# Patient Record
Sex: MALE | Race: BLACK OR AFRICAN AMERICAN | NOT HISPANIC OR LATINO
[De-identification: names, ages, dates, MRNs, and addresses within clinical notes are randomized per-mention and may not be internally consistent; named-entity substitution may affect disease eponyms.]

---

## 2017-12-01 ENCOUNTER — TRANSCRIPTION ENCOUNTER (OUTPATIENT)
Age: 77
End: 2017-12-01

## 2018-09-05 ENCOUNTER — APPOINTMENT (OUTPATIENT)
Dept: INTERNAL MEDICINE | Facility: CLINIC | Age: 78
End: 2018-09-05

## 2018-09-05 VITALS
OXYGEN SATURATION: 96 % | TEMPERATURE: 98.5 F | HEART RATE: 77 BPM | DIASTOLIC BLOOD PRESSURE: 88 MMHG | SYSTOLIC BLOOD PRESSURE: 136 MMHG | BODY MASS INDEX: 24.05 KG/M2 | HEIGHT: 70 IN | WEIGHT: 168 LBS

## 2018-09-05 NOTE — HISTORY OF PRESENT ILLNESS
[FreeTextEntry1] : routine phsical in 78 yo male [de-identified] : pt with hx of dm, htn and hyperlipidemia. on norvasc and metformin. pt feels well without complaints. no chest pain or sob. he follows a diet. His weight is stable.

## 2018-09-05 NOTE — PHYSICAL EXAM

## 2018-09-06 LAB
25(OH)D3 SERPL-MCNC: 29.2 NG/ML
ALBUMIN SERPL ELPH-MCNC: 4.6 G/DL
ALP BLD-CCNC: 112 U/L
ALT SERPL-CCNC: 8 U/L
ANION GAP SERPL CALC-SCNC: 15 MMOL/L
AST SERPL-CCNC: 22 U/L
BASOPHILS # BLD AUTO: 0.01 K/UL
BASOPHILS NFR BLD AUTO: 0.2 %
BILIRUB SERPL-MCNC: 0.3 MG/DL
BUN SERPL-MCNC: 15 MG/DL
CALCIUM SERPL-MCNC: 9.6 MG/DL
CHLORIDE SERPL-SCNC: 103 MMOL/L
CHOLEST SERPL-MCNC: 131 MG/DL
CHOLEST/HDLC SERPL: 3.3 RATIO
CO2 SERPL-SCNC: 24 MMOL/L
CREAT SERPL-MCNC: 1.37 MG/DL
EOSINOPHIL # BLD AUTO: 0.04 K/UL
EOSINOPHIL NFR BLD AUTO: 0.7 %
GLUCOSE SERPL-MCNC: 98 MG/DL
HBA1C MFR BLD HPLC: 7.2 %
HCT VFR BLD CALC: 37.5 %
HDLC SERPL-MCNC: 40 MG/DL
HGB BLD-MCNC: 11.6 G/DL
IMM GRANULOCYTES NFR BLD AUTO: 0 %
LDLC SERPL CALC-MCNC: 67 MG/DL
LYMPHOCYTES # BLD AUTO: 3.2 K/UL
LYMPHOCYTES NFR BLD AUTO: 57.3 %
MAN DIFF?: NORMAL
MCHC RBC-ENTMCNC: 27.6 PG
MCHC RBC-ENTMCNC: 30.9 GM/DL
MCV RBC AUTO: 89.1 FL
MONOCYTES # BLD AUTO: 0.51 K/UL
MONOCYTES NFR BLD AUTO: 9.1 %
NEUTROPHILS # BLD AUTO: 1.82 K/UL
NEUTROPHILS NFR BLD AUTO: 32.7 %
PLATELET # BLD AUTO: 108 K/UL
POTASSIUM SERPL-SCNC: 4.3 MMOL/L
PROT SERPL-MCNC: 7.7 G/DL
PSA SERPL-MCNC: 4.22 NG/ML
RBC # BLD: 4.21 M/UL
RBC # FLD: 17 %
SODIUM SERPL-SCNC: 142 MMOL/L
TRIGL SERPL-MCNC: 120 MG/DL
TSH SERPL-ACNC: 1.47 UIU/ML
WBC # FLD AUTO: 5.58 K/UL

## 2018-12-26 ENCOUNTER — RX RENEWAL (OUTPATIENT)
Age: 78
End: 2018-12-26

## 2019-01-09 ENCOUNTER — APPOINTMENT (OUTPATIENT)
Dept: INTERNAL MEDICINE | Facility: CLINIC | Age: 79
End: 2019-01-09
Payer: MEDICARE

## 2019-01-09 VITALS
HEART RATE: 76 BPM | OXYGEN SATURATION: 98 % | TEMPERATURE: 98 F | BODY MASS INDEX: 24.34 KG/M2 | WEIGHT: 170 LBS | HEIGHT: 70 IN | SYSTOLIC BLOOD PRESSURE: 110 MMHG | DIASTOLIC BLOOD PRESSURE: 80 MMHG

## 2019-01-09 PROCEDURE — 36415 COLL VENOUS BLD VENIPUNCTURE: CPT

## 2019-01-09 PROCEDURE — 99214 OFFICE O/P EST MOD 30 MIN: CPT | Mod: 25

## 2019-01-09 PROCEDURE — G0008: CPT

## 2019-01-09 PROCEDURE — 90662 IIV NO PRSV INCREASED AG IM: CPT

## 2019-01-09 NOTE — ASSESSMENT
[FreeTextEntry1] : Patient is to continue current medication. We'll check routine labs including urine for microalbumin. Influenza vaccine high dose has been given here

## 2019-01-09 NOTE — HISTORY OF PRESENT ILLNESS
[FreeTextEntry1] : Routine followup diabetes. [de-identified] : This is a 78-year-old male with a history of diabetes, thrombocytopenia. The patient feels well. Only complaint is occasional stiffness in the morning. He does not check his blood sugar. He generally is watching his diet his weight is stable at 170.

## 2019-01-10 LAB
ALBUMIN SERPL ELPH-MCNC: 4.7 G/DL
ALP BLD-CCNC: 126 U/L
ALT SERPL-CCNC: 5 U/L
ANION GAP SERPL CALC-SCNC: 11 MMOL/L
AST SERPL-CCNC: 12 U/L
BASOPHILS # BLD AUTO: 0.01 K/UL
BASOPHILS NFR BLD AUTO: 0.2 %
BILIRUB SERPL-MCNC: 0.3 MG/DL
BUN SERPL-MCNC: 17 MG/DL
CALCIUM SERPL-MCNC: 9.6 MG/DL
CHLORIDE SERPL-SCNC: 103 MMOL/L
CO2 SERPL-SCNC: 27 MMOL/L
CREAT SERPL-MCNC: 1.33 MG/DL
CREAT SPEC-SCNC: 364 MG/DL
EOSINOPHIL # BLD AUTO: 0.04 K/UL
EOSINOPHIL NFR BLD AUTO: 0.7 %
GLUCOSE SERPL-MCNC: 179 MG/DL
HBA1C MFR BLD HPLC: 8.1 %
HCT VFR BLD CALC: 38.6 %
HGB BLD-MCNC: 12.4 G/DL
IMM GRANULOCYTES NFR BLD AUTO: 0.2 %
LYMPHOCYTES # BLD AUTO: 2.88 K/UL
LYMPHOCYTES NFR BLD AUTO: 53 %
MAN DIFF?: NORMAL
MCHC RBC-ENTMCNC: 28.5 PG
MCHC RBC-ENTMCNC: 32.1 GM/DL
MCV RBC AUTO: 88.7 FL
MICROALBUMIN 24H UR DL<=1MG/L-MCNC: 3.5 MG/DL
MICROALBUMIN/CREAT 24H UR-RTO: 10 MG/G
MONOCYTES # BLD AUTO: 0.53 K/UL
MONOCYTES NFR BLD AUTO: 9.8 %
NEUTROPHILS # BLD AUTO: 1.96 K/UL
NEUTROPHILS NFR BLD AUTO: 36.1 %
PLATELET # BLD AUTO: 118 K/UL
POTASSIUM SERPL-SCNC: 5 MMOL/L
PROT SERPL-MCNC: 7.7 G/DL
RBC # BLD: 4.35 M/UL
RBC # FLD: 15.6 %
SODIUM SERPL-SCNC: 141 MMOL/L
WBC # FLD AUTO: 5.43 K/UL

## 2019-04-30 ENCOUNTER — APPOINTMENT (OUTPATIENT)
Dept: INTERNAL MEDICINE | Facility: CLINIC | Age: 79
End: 2019-04-30
Payer: MEDICARE

## 2019-04-30 ENCOUNTER — NON-APPOINTMENT (OUTPATIENT)
Age: 79
End: 2019-04-30

## 2019-04-30 VITALS
BODY MASS INDEX: 24.62 KG/M2 | SYSTOLIC BLOOD PRESSURE: 110 MMHG | HEART RATE: 69 BPM | WEIGHT: 172 LBS | DIASTOLIC BLOOD PRESSURE: 70 MMHG | OXYGEN SATURATION: 100 % | HEIGHT: 70 IN

## 2019-04-30 PROCEDURE — 36415 COLL VENOUS BLD VENIPUNCTURE: CPT

## 2019-04-30 PROCEDURE — 99214 OFFICE O/P EST MOD 30 MIN: CPT | Mod: 25

## 2019-04-30 PROCEDURE — 93000 ELECTROCARDIOGRAM COMPLETE: CPT

## 2019-04-30 NOTE — ASSESSMENT
[FreeTextEntry1] : EKG reveals minor nonspecific T-wave flattening in leads one and aVL. Patient's chest pain is certainly atypical. We'll check a hemoglobin A1c. Will have patient see a cardiologist regarding atypical chest pain

## 2019-04-30 NOTE — HISTORY OF PRESENT ILLNESS
[FreeTextEntry1] : Patient with atypical left anterior chest pain. [de-identified] : Patient is a 78-year-old diabetic who complains over the past 2 weeks for a vague left anterior chest dull discomfort which occurs on awakening in the morning. It lasts for about a half-hour to an hour and then goes away as the patient becomes more active. There is no pain the rest of the day. The discomfort is dull. There is no nausea vomiting no diaphoresis. There is no history of known cardiac disease. Patient is diabetic with his last hemoglobin A1c of 8.1. The patient tries to follow a diabetic diet but does not monitor his blood sugars. He is on metformin thousand milligrams b.i.d. Patient baseline mental status/Alert and oriented to person, place and time/Awake

## 2019-05-01 LAB
ESTIMATED AVERAGE GLUCOSE: 197 MG/DL
HBA1C MFR BLD HPLC: 8.5 %

## 2019-05-02 ENCOUNTER — APPOINTMENT (OUTPATIENT)
Dept: CARDIOLOGY | Facility: CLINIC | Age: 79
End: 2019-05-02
Payer: MEDICARE

## 2019-05-02 VITALS
TEMPERATURE: 98 F | HEIGHT: 70 IN | RESPIRATION RATE: 12 BRPM | SYSTOLIC BLOOD PRESSURE: 158 MMHG | WEIGHT: 168 LBS | HEART RATE: 60 BPM | OXYGEN SATURATION: 97 % | BODY MASS INDEX: 24.05 KG/M2 | DIASTOLIC BLOOD PRESSURE: 84 MMHG

## 2019-05-02 PROCEDURE — 99204 OFFICE O/P NEW MOD 45 MIN: CPT

## 2019-05-02 NOTE — PHYSICAL EXAM
[General Appearance - Well Developed] : well developed [General Appearance - Well Nourished] : well nourished [General Appearance - In No Acute Distress] : no acute distress [Normal Conjunctiva] : the conjunctiva exhibited no abnormalities [FreeTextEntry1] : No JVD present [Normal Rate] : normal [Rhythm Regular] : regular [Normal S1] : normal S1 [Normal S2] : normal S2 [S3] : no S3 [S4] : no S4 [No Murmur] : no murmurs heard [Right Carotid Bruit] : no bruit heard over the right carotid [Left Carotid Bruit] : no bruit heard over the left carotid [2+] : left 2+ [No Pitting Edema] : no pitting edema present [] : no respiratory distress [Respiration, Rhythm And Depth] : normal respiratory rhythm and effort [Auscultation Breath Sounds / Voice Sounds] : lungs were clear to auscultation bilaterally [Bowel Sounds] : normal bowel sounds [Abnormal Walk] : normal gait [Nail Clubbing] : no clubbing of the fingernails [Cyanosis, Localized] : no localized cyanosis [Oriented To Time, Place, And Person] : oriented to person, place, and time [Affect] : the affect was normal [Mood] : the mood was normal

## 2019-05-02 NOTE — ASSESSMENT
[FreeTextEntry1] : 77 yo male with hypertension, hyperlipidemia, and DM type 2, with new onset non-exertional chest pain over the past 2 weeks in addition to new onset exertional dyspnea. \par \par Given patient's symptoms and cardiac risk factors, will perform treadmill nuclear stress test for ischemic evaluation/risk stratification.\par Will perform echocardiogram to assess LV function and structural heart disease.\par Pending review of test results, will determine if further cardiac work-up or intervention is clinically indicated.\par \par Blood pressure is currently not well controlled on current regimen of amlodipine 10 mg po daily.\par Will add ramipril 5 mg po daily to current BP med regimen.\par Return for labs in 2 weeks to check BMP.\par \par LDL = 67 per 9/5/18 labs. Will continue current management of hyperlipidemia with atorvastatin 10 mg po daily for now pending stress test results.

## 2019-05-02 NOTE — REVIEW OF SYSTEMS
[Dyspnea on exertion] : dyspnea during exertion [Chest Pain] : chest pain [Lower Ext Edema] : no extremity edema [Palpitations] : no palpitations [Cough] : no cough [Wheezing] : no wheezing [Coughing Up Blood] : no hemoptysis [Negative] : Heme/Lymph

## 2019-05-02 NOTE — HISTORY OF PRESENT ILLNESS
[FreeTextEntry1] : 77 yo male with hypertension, hyperlipidemia, and DM type 2, who presents for chest pain evaluation. He reports his first episode of left sided chest pain ~ 2 weeks ago upon waking in the morning which lasted for 1 hour before resolving. However, he has been having recurring similar chest pain every morning since that time, but only lasts for several minutes before resolving. Patient denies dyspnea, palpitations, syncope, edema, melena, hematochezia, or hematemesis. He denies exertional chest with stairs, but does report new exertional dyspnea when walking up hills. He denies any prior cardiac testing.\par \par PMD: Damon Johnson MD

## 2019-05-16 ENCOUNTER — APPOINTMENT (OUTPATIENT)
Dept: CARDIOLOGY | Facility: CLINIC | Age: 79
End: 2019-05-16

## 2019-05-20 ENCOUNTER — MESSAGE (OUTPATIENT)
Age: 79
End: 2019-05-20

## 2019-05-20 LAB
ANION GAP SERPL CALC-SCNC: 12 MMOL/L
BUN SERPL-MCNC: 15 MG/DL
CALCIUM SERPL-MCNC: 9.5 MG/DL
CHLORIDE SERPL-SCNC: 104 MMOL/L
CO2 SERPL-SCNC: 25 MMOL/L
CREAT SERPL-MCNC: 1.11 MG/DL
GLUCOSE SERPL-MCNC: 166 MG/DL
POTASSIUM SERPL-SCNC: 4.6 MMOL/L
SODIUM SERPL-SCNC: 141 MMOL/L

## 2019-05-21 ENCOUNTER — APPOINTMENT (OUTPATIENT)
Dept: CARDIOLOGY | Facility: CLINIC | Age: 79
End: 2019-05-21
Payer: MEDICARE

## 2019-05-21 PROCEDURE — 93306 TTE W/DOPPLER COMPLETE: CPT

## 2019-05-22 ENCOUNTER — MESSAGE (OUTPATIENT)
Age: 79
End: 2019-05-22

## 2019-05-28 ENCOUNTER — RESULT REVIEW (OUTPATIENT)
Age: 79
End: 2019-05-28

## 2019-05-29 ENCOUNTER — MESSAGE (OUTPATIENT)
Age: 79
End: 2019-05-29

## 2019-08-30 ENCOUNTER — MEDICATION RENEWAL (OUTPATIENT)
Age: 79
End: 2019-08-30

## 2019-11-06 ENCOUNTER — APPOINTMENT (OUTPATIENT)
Dept: INTERNAL MEDICINE | Facility: CLINIC | Age: 79
End: 2019-11-06

## 2019-11-07 ENCOUNTER — LABORATORY RESULT (OUTPATIENT)
Age: 79
End: 2019-11-07

## 2019-11-07 ENCOUNTER — APPOINTMENT (OUTPATIENT)
Dept: INTERNAL MEDICINE | Facility: CLINIC | Age: 79
End: 2019-11-07
Payer: MEDICARE

## 2019-11-07 DIAGNOSIS — R07.9 CHEST PAIN, UNSPECIFIED: ICD-10-CM

## 2019-11-07 DIAGNOSIS — R06.02 SHORTNESS OF BREATH: ICD-10-CM

## 2019-11-07 PROCEDURE — 36415 COLL VENOUS BLD VENIPUNCTURE: CPT

## 2019-11-08 LAB
25(OH)D3 SERPL-MCNC: 25.6 NG/ML
ALBUMIN SERPL ELPH-MCNC: 4.5 G/DL
ALP BLD-CCNC: 116 U/L
ALT SERPL-CCNC: 7 U/L
ANION GAP SERPL CALC-SCNC: 12 MMOL/L
AST SERPL-CCNC: 15 U/L
BASOPHILS # BLD AUTO: 0.02 K/UL
BASOPHILS NFR BLD AUTO: 0.4 %
BILIRUB SERPL-MCNC: 0.3 MG/DL
BUN SERPL-MCNC: 13 MG/DL
CALCIUM SERPL-MCNC: 9.5 MG/DL
CHLORIDE SERPL-SCNC: 102 MMOL/L
CHOLEST SERPL-MCNC: 135 MG/DL
CHOLEST/HDLC SERPL: 2.9 RATIO
CO2 SERPL-SCNC: 27 MMOL/L
CREAT SERPL-MCNC: 1.1 MG/DL
EOSINOPHIL # BLD AUTO: 0.04 K/UL
EOSINOPHIL NFR BLD AUTO: 0.9 %
ESTIMATED AVERAGE GLUCOSE: 143 MG/DL
GLUCOSE SERPL-MCNC: 177 MG/DL
HBA1C MFR BLD HPLC: 6.6 %
HCT VFR BLD CALC: 38.1 %
HDLC SERPL-MCNC: 47 MG/DL
HGB BLD-MCNC: 11.9 G/DL
IMM GRANULOCYTES NFR BLD AUTO: 0.2 %
LDLC SERPL CALC-MCNC: 70 MG/DL
LYMPHOCYTES # BLD AUTO: 2.39 K/UL
LYMPHOCYTES NFR BLD AUTO: 53.6 %
MAN DIFF?: NORMAL
MCHC RBC-ENTMCNC: 29 PG
MCHC RBC-ENTMCNC: 31.2 GM/DL
MCV RBC AUTO: 92.9 FL
MONOCYTES # BLD AUTO: 0.47 K/UL
MONOCYTES NFR BLD AUTO: 10.5 %
NEUTROPHILS # BLD AUTO: 1.53 K/UL
NEUTROPHILS NFR BLD AUTO: 34.4 %
PLATELET # BLD AUTO: 90 K/UL
POTASSIUM SERPL-SCNC: 4.5 MMOL/L
PROT SERPL-MCNC: 7.4 G/DL
PSA SERPL-MCNC: 6.84 NG/ML
RBC # BLD: 4.1 M/UL
RBC # FLD: 16.4 %
SODIUM SERPL-SCNC: 141 MMOL/L
TRIGL SERPL-MCNC: 88 MG/DL
TSH SERPL-ACNC: 1.07 UIU/ML
WBC # FLD AUTO: 4.46 K/UL

## 2019-11-12 ENCOUNTER — APPOINTMENT (OUTPATIENT)
Dept: INTERNAL MEDICINE | Facility: CLINIC | Age: 79
End: 2019-11-12
Payer: MEDICARE

## 2019-11-12 VITALS
HEIGHT: 70 IN | BODY MASS INDEX: 23.91 KG/M2 | HEART RATE: 68 BPM | WEIGHT: 167 LBS | OXYGEN SATURATION: 97 % | SYSTOLIC BLOOD PRESSURE: 110 MMHG | DIASTOLIC BLOOD PRESSURE: 70 MMHG

## 2019-11-12 PROCEDURE — 99214 OFFICE O/P EST MOD 30 MIN: CPT | Mod: 25

## 2019-11-12 PROCEDURE — 90686 IIV4 VACC NO PRSV 0.5 ML IM: CPT

## 2019-11-12 PROCEDURE — G0008: CPT

## 2019-11-12 NOTE — HISTORY OF PRESENT ILLNESS
[de-identified] : This is a 79-year-old male with a history of diabetes, hypertension thrombocytopenia and BPH. His medications were reviewed. They include amlodipine 10, Lipitor 10, metformin thousand milligrams b.i.d. Patient generally feels well without chest pain or shortness of breath. Patient is known to have thrombocytopenia. He saw a hematologist about 3 years ago. Patient's last colonoscopy was 3 years ago. He is not sure if he had the pneumonia vaccine. His memory is slightly impaired. He's been watching his diet more carefully. His labs are currently remarkable for hemoglobin A1c of 6.6, PSA of 6.84 and a platelet count of 90,000. [FreeTextEntry1] : Routine physical and 79-year-old male

## 2019-11-12 NOTE — ASSESSMENT
[FreeTextEntry1] : Patient's blood pressure and diabetes appear under reasonably good control. I am concerned about the platelet count 90,000. I will ask patient to see a hematologist. The elevated PSA goes along with his markedly enlarged prostate. Influenza vaccine high dose has been given. Patient is to return to see me in 6 months

## 2019-11-12 NOTE — PHYSICAL EXAM
[No Acute Distress] : no acute distress [Well Nourished] : well nourished [Well Developed] : well developed [PERRL] : pupils equal round and reactive to light [Well-Appearing] : well-appearing [Normal Sclera/Conjunctiva] : normal sclera/conjunctiva [EOMI] : extraocular movements intact [Normal Oropharynx] : the oropharynx was normal [Normal Outer Ear/Nose] : the outer ears and nose were normal in appearance [No JVD] : no jugular venous distention [No Lymphadenopathy] : no lymphadenopathy [Supple] : supple [Thyroid Normal, No Nodules] : the thyroid was normal and there were no nodules present [No Respiratory Distress] : no respiratory distress  [No Accessory Muscle Use] : no accessory muscle use [Clear to Auscultation] : lungs were clear to auscultation bilaterally [Normal Rate] : normal rate  [Normal S1, S2] : normal S1 and S2 [Regular Rhythm] : with a regular rhythm [No Murmur] : no murmur heard [No Carotid Bruits] : no carotid bruits [No Abdominal Bruit] : a ~M bruit was not heard ~T in the abdomen [No Varicosities] : no varicosities [Pedal Pulses Present] : the pedal pulses are present [No Edema] : there was no peripheral edema [No Palpable Aorta] : no palpable aorta [No Extremity Clubbing/Cyanosis] : no extremity clubbing/cyanosis [Soft] : abdomen soft [Non Tender] : non-tender [Non-distended] : non-distended [No Masses] : no abdominal mass palpated [No HSM] : no HSM [Normal Bowel Sounds] : normal bowel sounds [No Mass] : no mass [Normal Posterior Cervical Nodes] : no posterior cervical lymphadenopathy [Normal Anterior Cervical Nodes] : no anterior cervical lymphadenopathy [No CVA Tenderness] : no CVA  tenderness [No Spinal Tenderness] : no spinal tenderness [No Joint Swelling] : no joint swelling [No Rash] : no rash [Grossly Normal Strength/Tone] : grossly normal strength/tone [No Focal Deficits] : no focal deficits [Coordination Grossly Intact] : coordination grossly intact [Normal Gait] : normal gait [Normal Affect] : the affect was normal [Normal Insight/Judgement] : insight and judgment were intact [Stool Occult Blood] : stool negative for occult blood [FreeTextEntry1] : 3+ prostate

## 2020-03-31 ENCOUNTER — RX RENEWAL (OUTPATIENT)
Age: 80
End: 2020-03-31

## 2020-05-12 ENCOUNTER — APPOINTMENT (OUTPATIENT)
Dept: INTERNAL MEDICINE | Facility: CLINIC | Age: 80
End: 2020-05-12

## 2020-11-09 ENCOUNTER — RESULT CHARGE (OUTPATIENT)
Age: 80
End: 2020-11-09

## 2020-11-10 ENCOUNTER — APPOINTMENT (OUTPATIENT)
Dept: INTERNAL MEDICINE | Facility: CLINIC | Age: 80
End: 2020-11-10
Payer: MEDICARE

## 2020-11-10 ENCOUNTER — NON-APPOINTMENT (OUTPATIENT)
Age: 80
End: 2020-11-10

## 2020-11-10 VITALS
OXYGEN SATURATION: 98 % | DIASTOLIC BLOOD PRESSURE: 80 MMHG | SYSTOLIC BLOOD PRESSURE: 120 MMHG | HEART RATE: 69 BPM | WEIGHT: 163 LBS | HEIGHT: 70 IN | BODY MASS INDEX: 23.34 KG/M2

## 2020-11-10 DIAGNOSIS — Z00.00 ENCOUNTER FOR GENERAL ADULT MEDICAL EXAMINATION W/OUT ABNORMAL FINDINGS: ICD-10-CM

## 2020-11-10 PROCEDURE — 90662 IIV NO PRSV INCREASED AG IM: CPT

## 2020-11-10 PROCEDURE — 93000 ELECTROCARDIOGRAM COMPLETE: CPT

## 2020-11-10 PROCEDURE — 36415 COLL VENOUS BLD VENIPUNCTURE: CPT

## 2020-11-10 PROCEDURE — G0008: CPT

## 2020-11-10 PROCEDURE — G0439: CPT

## 2020-11-10 NOTE — PHYSICAL EXAM
[No Acute Distress] : no acute distress [Well Nourished] : well nourished [Well Developed] : well developed [Well-Appearing] : well-appearing [Normal Sclera/Conjunctiva] : normal sclera/conjunctiva [PERRL] : pupils equal round and reactive to light [EOMI] : extraocular movements intact [Normal Outer Ear/Nose] : the outer ears and nose were normal in appearance [Normal Oropharynx] : the oropharynx was normal [No JVD] : no jugular venous distention [No Lymphadenopathy] : no lymphadenopathy [Supple] : supple [Thyroid Normal, No Nodules] : the thyroid was normal and there were no nodules present [No Respiratory Distress] : no respiratory distress  [No Accessory Muscle Use] : no accessory muscle use [Clear to Auscultation] : lungs were clear to auscultation bilaterally [Normal Rate] : normal rate  [Regular Rhythm] : with a regular rhythm [Normal S1, S2] : normal S1 and S2 [No Murmur] : no murmur heard [No Carotid Bruits] : no carotid bruits [No Abdominal Bruit] : a ~M bruit was not heard ~T in the abdomen [No Varicosities] : no varicosities [Pedal Pulses Present] : the pedal pulses are present [No Edema] : there was no peripheral edema [No Palpable Aorta] : no palpable aorta [No Extremity Clubbing/Cyanosis] : no extremity clubbing/cyanosis [Soft] : abdomen soft [Non Tender] : non-tender [Non-distended] : non-distended [No Masses] : no abdominal mass palpated [No HSM] : no HSM [Normal Bowel Sounds] : normal bowel sounds [Normal Posterior Cervical Nodes] : no posterior cervical lymphadenopathy [Normal Anterior Cervical Nodes] : no anterior cervical lymphadenopathy [No CVA Tenderness] : no CVA  tenderness [No Spinal Tenderness] : no spinal tenderness [No Joint Swelling] : no joint swelling [Grossly Normal Strength/Tone] : grossly normal strength/tone [No Rash] : no rash [Coordination Grossly Intact] : coordination grossly intact [No Focal Deficits] : no focal deficits [Normal Gait] : normal gait [Normal Affect] : the affect was normal [Normal Insight/Judgement] : insight and judgment were intact [FreeTextEntry1] : 2+ prostate

## 2020-11-10 NOTE — HEALTH RISK ASSESSMENT
[Very Good] : ~his/her~  mood as very good [No] : No [No falls in past year] : Patient reported no falls in the past year [0] : 2) Feeling down, depressed, or hopeless: Not at all (0) [No Retinopathy] : No retinopathy [Patient reported colonoscopy was normal] : Patient reported colonoscopy was normal [HIV test declined] : HIV test declined [Hepatitis C test declined] : Hepatitis C test declined [None] : None [With Family] : lives with family [# of Members in Household ___] :  household currently consist of [unfilled] member(s) [Retired] : retired [] :  [# Of Children ___] : has [unfilled] children [Feels Safe at Home] : Feels safe at home [Fully functional (bathing, dressing, toileting, transferring, walking, feeding)] : Fully functional (bathing, dressing, toileting, transferring, walking, feeding) [Fully functional (using the telephone, shopping, preparing meals, housekeeping, doing laundry, using] : Fully functional and needs no help or supervision to perform IADLs (using the telephone, shopping, preparing meals, housekeeping, doing laundry, using transportation, managing medications and managing finances) [Reports normal functional visual acuity (ie: able to read med bottle)] : Reports normal functional visual acuity [Patient/Caregiver not ready to engage] : Patient/Caregiver not ready to engage [] : No [de-identified] : works around house [de-identified] : good [EyeExamDate] : 01/19 [Change in mental status noted] : No change in mental status noted [Language] : denies difficulty with language [Behavior] : denies difficulty with behavior [Learning/Retaining New Information] : denies difficulty learning/retaining new information [Handling Complex Tasks] : denies difficulty handling complex tasks [Reasoning] : denies difficulty with reasoning [Spatial Ability and Orientation] : denies difficulty with spatial ability and orientation [Reports changes in hearing] : Reports no changes in hearing [Reports changes in vision] : Reports no changes in vision [ColonoscopyDate] : 01/15

## 2020-11-10 NOTE — ASSESSMENT
[FreeTextEntry1] : Patient appears in good health. We'll check routine labs. High dose flu vaccine has been given.

## 2020-11-10 NOTE — HISTORY OF PRESENT ILLNESS
[FreeTextEntry1] :  wellness exam. [de-identified] : This is an 80-year-old male with a history of diabetes, hypertension, hyperlipidemia and chronic thrombocytopenia. He has not been followed in the past one year. His medications include metformin 1000 b.i.d., amlodipine 10, Lipitor 10. He feels well without chronic complaints.

## 2020-11-19 LAB
25(OH)D3 SERPL-MCNC: 23.1 NG/ML
ALBUMIN SERPL ELPH-MCNC: 4.5 G/DL
ALP BLD-CCNC: 138 U/L
ALT SERPL-CCNC: 7 U/L
ANION GAP SERPL CALC-SCNC: 16 MMOL/L
AST SERPL-CCNC: 16 U/L
BASOPHILS # BLD AUTO: 0.03 K/UL
BASOPHILS NFR BLD AUTO: 0.6 %
BILIRUB SERPL-MCNC: 0.2 MG/DL
BUN SERPL-MCNC: 13 MG/DL
CALCIUM SERPL-MCNC: 9 MG/DL
CHLORIDE SERPL-SCNC: 103 MMOL/L
CHOLEST SERPL-MCNC: 137 MG/DL
CO2 SERPL-SCNC: 22 MMOL/L
CREAT SERPL-MCNC: 1.13 MG/DL
EOSINOPHIL # BLD AUTO: 0.08 K/UL
EOSINOPHIL NFR BLD AUTO: 1.6 %
ESTIMATED AVERAGE GLUCOSE: 151 MG/DL
GLUCOSE SERPL-MCNC: 154 MG/DL
HBA1C MFR BLD HPLC: 6.9 %
HCT VFR BLD CALC: 37.1 %
HDLC SERPL-MCNC: 44 MG/DL
HGB BLD-MCNC: 11.6 G/DL
IMM GRANULOCYTES NFR BLD AUTO: 0.4 %
LDLC SERPL CALC-MCNC: 74 MG/DL
LYMPHOCYTES # BLD AUTO: 2.36 K/UL
LYMPHOCYTES NFR BLD AUTO: 48.5 %
MAN DIFF?: NORMAL
MCHC RBC-ENTMCNC: 29 PG
MCHC RBC-ENTMCNC: 31.3 GM/DL
MCV RBC AUTO: 92.8 FL
MONOCYTES # BLD AUTO: 0.4 K/UL
MONOCYTES NFR BLD AUTO: 8.2 %
NEUTROPHILS # BLD AUTO: 1.98 K/UL
NEUTROPHILS NFR BLD AUTO: 40.7 %
NONHDLC SERPL-MCNC: 93 MG/DL
PLATELET # BLD AUTO: 90 K/UL
POTASSIUM SERPL-SCNC: 4.2 MMOL/L
PROT SERPL-MCNC: 7.6 G/DL
PSA SERPL-MCNC: 6.5 NG/ML
RBC # BLD: 4 M/UL
RBC # FLD: 16.4 %
SODIUM SERPL-SCNC: 141 MMOL/L
TRIGL SERPL-MCNC: 95 MG/DL
TSH SERPL-ACNC: 1.14 UIU/ML
WBC # FLD AUTO: 4.87 K/UL

## 2020-12-03 ENCOUNTER — RESULT REVIEW (OUTPATIENT)
Age: 80
End: 2020-12-03

## 2020-12-03 ENCOUNTER — APPOINTMENT (OUTPATIENT)
Dept: HEMATOLOGY ONCOLOGY | Facility: CLINIC | Age: 80
End: 2020-12-03
Payer: MEDICARE

## 2020-12-03 VITALS
RESPIRATION RATE: 18 BRPM | BODY MASS INDEX: 23.55 KG/M2 | HEIGHT: 70 IN | DIASTOLIC BLOOD PRESSURE: 78 MMHG | SYSTOLIC BLOOD PRESSURE: 128 MMHG | OXYGEN SATURATION: 99 % | HEART RATE: 68 BPM | TEMPERATURE: 97.3 F | WEIGHT: 164.5 LBS

## 2020-12-03 PROCEDURE — 99205 OFFICE O/P NEW HI 60 MIN: CPT

## 2020-12-03 RX ORDER — RAMIPRIL 5 MG/1
5 CAPSULE ORAL
Qty: 90 | Refills: 3 | Status: DISCONTINUED | COMMUNITY
Start: 2019-05-02 | End: 2020-12-03

## 2020-12-03 NOTE — ASSESSMENT
[FreeTextEntry1] : Anemia\par Thrombocytopenia\par \par Discussed about differential diagnosis\par Med list reviewed and unlikely\par Not on any herbal products or OTC meds\par Send blood work including B12, folate, TSH, myeloma panel, iron studies, hemolysis panel, etc\par Ultrasound abdomen to look for liver disease, hepatosplenomegaly\par If no identifiable cause with above, will need bone marrow\par \par Elevated PSA\par Mild LUTS\par Refer to urology\par \par Patient had multiple questions which were answered to satisfaction\par \par Follow up in 2 weeks\par No labs

## 2020-12-03 NOTE — HISTORY OF PRESENT ILLNESS
[de-identified] : Mr. José Miguel Steven is 80 year old male here for evaluation for normocytic anemia and thrombocytopenia, referred by Dr. Samuel Johnson.\par \par Patient history of diabetes, hypertension, hyperlipidemia, enlarge prostate who has check up with his PCP Dr. Johnson with blood work as shown. \par 11/19/20  Hgb 11.6 Hct 37.1 MCV 92.8 Plts 90 - Alk phos - 138  labs low since 2017\par PSA - 4.22 ->4.84 -> 6.50 \par FHx: no hematology and/or oncology disorder\par Patient reports of doing well except for intermittent some mild minimal discomfort nocturia No hematuria or frequency. \par

## 2020-12-07 ENCOUNTER — RESULT REVIEW (OUTPATIENT)
Age: 80
End: 2020-12-07

## 2020-12-23 ENCOUNTER — APPOINTMENT (OUTPATIENT)
Dept: HEMATOLOGY ONCOLOGY | Facility: CLINIC | Age: 80
End: 2020-12-23
Payer: MEDICARE

## 2020-12-23 VITALS
HEIGHT: 70 IN | TEMPERATURE: 97.7 F | HEART RATE: 67 BPM | RESPIRATION RATE: 18 BRPM | DIASTOLIC BLOOD PRESSURE: 82 MMHG | BODY MASS INDEX: 23.19 KG/M2 | OXYGEN SATURATION: 99 % | SYSTOLIC BLOOD PRESSURE: 145 MMHG | WEIGHT: 162 LBS

## 2020-12-23 PROCEDURE — 99215 OFFICE O/P EST HI 40 MIN: CPT

## 2020-12-23 NOTE — ASSESSMENT
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 Discussed about differential diagnosis including MGUS, SMM, MM\par Differential diagnosis include MDS vs Multiple myeloma vs borderline B12\par Schedule bone marrow aspiration and biopsy\par Start B12 1000 mcg sublingual once a day\par \par Elevated PSA\par LUTS\par Refer to urology\par He wants to schedule visit after the holidays\par \par Patient had multiple questions which were answered to satisfaction\par \par Follow up in 2 weeks for bone marrow\par No labs

## 2020-12-23 NOTE — HISTORY OF PRESENT ILLNESS
[de-identified] : Mr. José Miguel Steven is 80 year old male here for evaluation for normocytic anemia and thrombocytopenia, referred by Dr. Samuel Johnson.\par \par Patient history of diabetes, hypertension, hyperlipidemia, enlarge prostate who has check up with his PCP Dr. Johnson with blood work as shown. \par 11/19/20  Hgb 11.6 Hct 37.1 MCV 92.8 Plts 90 - Alk phos - 138  labs low since 2017\par PSA - 4.22 ->4.84 -> 6.50 \par FHx: no hematology and/or oncology disorder\par Patient reports of doing well except for intermittent some mild minimal discomfort nocturia No hematuria or frequency. \par  [de-identified] : He is seen today for follow up\par \par He feels mild tiredness\par Has to urinate 3-4 times at night. Has not seen urology as yet. He wants to wait until after holidays

## 2021-01-04 NOTE — REVIEW OF SYSTEMS
Patient transferred back to [address] [place] [address], per cart, per EPD. [Negative] : Heme/Lymph

## 2021-01-11 ENCOUNTER — RESULT REVIEW (OUTPATIENT)
Age: 81
End: 2021-01-11

## 2021-01-12 ENCOUNTER — RESULT REVIEW (OUTPATIENT)
Age: 81
End: 2021-01-12

## 2021-01-12 ENCOUNTER — APPOINTMENT (OUTPATIENT)
Dept: HEMATOLOGY ONCOLOGY | Facility: CLINIC | Age: 81
End: 2021-01-12
Payer: MEDICARE

## 2021-01-12 VITALS
HEIGHT: 70 IN | RESPIRATION RATE: 16 BRPM | OXYGEN SATURATION: 100 % | DIASTOLIC BLOOD PRESSURE: 69 MMHG | WEIGHT: 166 LBS | SYSTOLIC BLOOD PRESSURE: 145 MMHG | HEART RATE: 72 BPM | TEMPERATURE: 97.6 F | BODY MASS INDEX: 23.77 KG/M2

## 2021-01-12 DIAGNOSIS — Z11.59 ENCOUNTER FOR SCREENING FOR OTHER VIRAL DISEASES: ICD-10-CM

## 2021-01-12 PROCEDURE — 88360 TUMOR IMMUNOHISTOCHEM/MANUAL: CPT | Mod: 26

## 2021-01-12 PROCEDURE — 88305 TISSUE EXAM BY PATHOLOGIST: CPT | Mod: 26

## 2021-01-12 PROCEDURE — 38222 DX BONE MARROW BX & ASPIR: CPT | Mod: LT

## 2021-01-12 PROCEDURE — 99214 OFFICE O/P EST MOD 30 MIN: CPT | Mod: CS,25

## 2021-01-12 PROCEDURE — 85097 BONE MARROW INTERPRETATION: CPT

## 2021-01-12 PROCEDURE — 88189 FLOWCYTOMETRY/READ 16 & >: CPT

## 2021-01-12 PROCEDURE — 88342 IMHCHEM/IMCYTCHM 1ST ANTB: CPT | Mod: 26,59

## 2021-01-12 PROCEDURE — 88341 IMHCHEM/IMCYTCHM EA ADD ANTB: CPT | Mod: 26,59

## 2021-01-12 PROCEDURE — 88313 SPECIAL STAINS GROUP 2: CPT | Mod: 26

## 2021-01-12 NOTE — ASSESSMENT
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 Discussed about differential diagnosis including MGUS, SMM, MM\par Differential diagnosis include MDS vs Multiple myeloma vs borderline B12\par Started B12 1000 mcg sublingual once a day\par Bone marrow aspiration and biopsy today\par \par Elevated PSA\par LUTS\par Refer to urology\par \par Patient had multiple questions which were answered to satisfaction\par \par Follow up in 2 weeks \par No labs

## 2021-01-12 NOTE — PROCEDURE
[Bone Marrow Biopsy] : bone marrow biopsy [Bone Marrow Aspiration] : bone marrow aspiration  [Patient] : the patient [Patient identification verified] : patient identification verified [Procedure verified and consent obtained] : procedure verified and consent obtained [Laterality verified and correct site marked] : laterality verified and correct site marked [Left] : site: left [Correct positioning] : correct positioning [Prone] : prone [Superior iliac spine was identified] : the superior iliac spine was identified. [The left posterior iliac crest was prepped with betadine and draped, using sterile technique.] : The left posterior iliac crest was prepped with betadine and draped, using sterile technique. [Lidocaine was injected and into the periosteum overlying the site.] : Lidocaine was injected and into the periosteum overlying the site. [Aspirate] : aspirate [Cytogenetics] : cytogenetics [FISH] : FISH [PCR] : PCR [Other ___] : [unfilled] [Biopsy] : biopsy [Flow Cytometry] : flow cytometry [] : The patient was instructed to remove the bandage the following AM. The patient may bathe. Acetaminophen may be taken for discomfort, as per package directions.If there are any other problems, the patient was instructed to call the office. The patient verbalized understanding, and is aware of the office contact numbers. [FreeTextEntry1] : Rule out MM or MDS

## 2021-01-12 NOTE — HISTORY OF PRESENT ILLNESS
[de-identified] : Mr. José Miguel Steven is 80 year old male here for evaluation for normocytic anemia and thrombocytopenia, referred by Dr. Samuel Johnson.\par \par Patient history of diabetes, hypertension, hyperlipidemia, enlarge prostate who has check up with his PCP Dr. Johnson with blood work as shown. \par 11/19/20  Hgb 11.6 Hct 37.1 MCV 92.8 Plts 90 - Alk phos - 138  labs low since 2017\par PSA - 4.22 ->4.84 -> 6.50 \par FHx: no hematology and/or oncology disorder\par Patient reports of doing well except for intermittent some mild minimal discomfort nocturia No hematuria or frequency. \par  [de-identified] : He is seen today for follow up and bone marrow\par \par Tiredness stable\par Continues to have to urinate 3-4 times at night. Has not seen urology as yet\par

## 2021-01-26 ENCOUNTER — APPOINTMENT (OUTPATIENT)
Dept: HEMATOLOGY ONCOLOGY | Facility: CLINIC | Age: 81
End: 2021-01-26
Payer: MEDICARE

## 2021-01-29 NOTE — HISTORY OF PRESENT ILLNESS
[de-identified] : Mr. José Miguel Steven is 80 year old male here for evaluation for normocytic anemia and thrombocytopenia, referred by Dr. Samuel Johnson.\par \par Patient history of diabetes, hypertension, hyperlipidemia, enlarge prostate who has check up with his PCP Dr. Johnson with blood work as shown. \par 11/19/20  Hgb 11.6 Hct 37.1 MCV 92.8 Plts 90 - Alk phos - 138  labs low since 2017\par PSA - 4.22 ->4.84 -> 6.50 \par FHx: no hematology and/or oncology disorder\par Patient reports of doing well except for intermittent some mild minimal discomfort nocturia No hematuria or frequency. \par  [de-identified] : He is seen today for follow up and bone marrow\par \par Tiredness stable\par Continues to have to urinate 3-4 times at night. Has not seen urology as yet\par

## 2021-02-02 ENCOUNTER — APPOINTMENT (OUTPATIENT)
Dept: HEMATOLOGY ONCOLOGY | Facility: CLINIC | Age: 81
End: 2021-02-02
Payer: MEDICARE

## 2021-02-09 ENCOUNTER — APPOINTMENT (OUTPATIENT)
Dept: HEMATOLOGY ONCOLOGY | Facility: CLINIC | Age: 81
End: 2021-02-09
Payer: MEDICARE

## 2021-02-09 VITALS
OXYGEN SATURATION: 99 % | HEIGHT: 70 IN | BODY MASS INDEX: 24.31 KG/M2 | RESPIRATION RATE: 16 BRPM | WEIGHT: 169.8 LBS | TEMPERATURE: 96.8 F | SYSTOLIC BLOOD PRESSURE: 136 MMHG | DIASTOLIC BLOOD PRESSURE: 65 MMHG | HEART RATE: 68 BPM

## 2021-02-09 PROCEDURE — 99215 OFFICE O/P EST HI 40 MIN: CPT

## 2021-02-09 NOTE — ASSESSMENT
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 \par M spike 1 g/dl. \par Likely MGUS\par \par B12 deficiency\par Continue B12 1000 mcg sublingual once a day\par \par Elevated PSA\par LUTS\par Refer to urology. Encouraged to see urology\par \par Labs in 1 month. CBC, CMP, B12, myeloma panel, PSA\par Office visit few days later

## 2021-02-09 NOTE — RESULTS/DATA
[FreeTextEntry1] : Labs reviewed, analyzed and discussed\par \par Bone marrow biopsy, bone marrow clot, and bone marrow\par aspirate\par - Myelodysplastic syndrome with multilineage dysplasia and\par ring sideroblasts\par \par Diagnostic Note:\par As per chart review and clinical history, the patient has been\par under treatment for decreased B12 for several weeks. The\par morphology is consistent with myelodysplastic syndrome with\par multilineage dysplasia and ring sideroblasts. Correlation with\par FISH shows no del(5q) and cytogenetics shows trisomy 8, excluding\par the diagnosis of isolated del(5q). [employer] NGS Myeloid Disorder\par Sequencing Report detected a Tier II Variants of Potential\par Clinical Significance, ETV6 p.[id number] (Allele frequency: 29%),\par which may be seen in myelodysplasia. Given these findings the MDS\par is best classified as MDS with multilineage dysplasia and ring\par sideroblasts.\par \par Increased plasma cells are not seen and flow cytometry of the\par plasma cells shows polytypic plasma cells.\par \par

## 2021-02-09 NOTE — HISTORY OF PRESENT ILLNESS
[de-identified] : Mr. José Miguel Steven is 80 year old male here for evaluation for normocytic anemia and thrombocytopenia, referred by Dr. Samuel Johnson.\par \par Patient history of diabetes, hypertension, hyperlipidemia, enlarge prostate who has check up with his PCP Dr. Johnson with blood work as shown. \par 11/19/20  Hgb 11.6 Hct 37.1 MCV 92.8 Plts 90 - Alk phos - 138  labs low since 2017\par PSA - 4.22 ->4.84 -> 6.50 \par FHx: no hematology and/or oncology disorder\par Patient reports of doing well except for intermittent some mild minimal discomfort nocturia No hematuria or frequency. \par  [de-identified] : He is seen today for follow up \par Last seen several weeks ago, he missed a few appointments\par \par He feels good overall\par Energy stable

## 2021-03-09 ENCOUNTER — RESULT REVIEW (OUTPATIENT)
Age: 81
End: 2021-03-09

## 2021-03-09 ENCOUNTER — APPOINTMENT (OUTPATIENT)
Dept: HEMATOLOGY ONCOLOGY | Facility: CLINIC | Age: 81
End: 2021-03-09

## 2021-03-09 VITALS
DIASTOLIC BLOOD PRESSURE: 68 MMHG | TEMPERATURE: 98.3 F | BODY MASS INDEX: 24.05 KG/M2 | HEART RATE: 90 BPM | SYSTOLIC BLOOD PRESSURE: 140 MMHG | WEIGHT: 168 LBS | HEIGHT: 70 IN | RESPIRATION RATE: 18 BRPM | OXYGEN SATURATION: 99 %

## 2021-03-16 ENCOUNTER — APPOINTMENT (OUTPATIENT)
Dept: HEMATOLOGY ONCOLOGY | Facility: CLINIC | Age: 81
End: 2021-03-16
Payer: MEDICARE

## 2021-03-16 VITALS
BODY MASS INDEX: 24.05 KG/M2 | TEMPERATURE: 98 F | WEIGHT: 168 LBS | HEART RATE: 85 BPM | RESPIRATION RATE: 18 BRPM | HEIGHT: 70 IN | DIASTOLIC BLOOD PRESSURE: 66 MMHG | SYSTOLIC BLOOD PRESSURE: 122 MMHG | OXYGEN SATURATION: 97 %

## 2021-03-16 PROCEDURE — 99214 OFFICE O/P EST MOD 30 MIN: CPT

## 2021-03-17 NOTE — HISTORY OF PRESENT ILLNESS
[de-identified] : Mr. José Miguel Steven is 80 year old male here for evaluation for normocytic anemia and thrombocytopenia, referred by Dr. Samuel Johnson.\par \par Patient history of diabetes, hypertension, hyperlipidemia, enlarge prostate who has check up with his PCP Dr. Johnson with blood work as shown. \par 11/19/20  Hgb 11.6 Hct 37.1 MCV 92.8 Plts 90 - Alk phos - 138  labs low since 2017\par PSA - 4.22 ->4.84 -> 6.50 \par FHx: no hematology and/or oncology disorder\par Patient reports of doing well except for intermittent some mild minimal discomfort nocturia No hematuria or frequency. \par \par Bone marrow biopsy, bone marrow clot, and bone marrow\par aspirate\par - Myelodysplastic syndrome with multilineage dysplasia and\par ring sideroblasts\par Diagnostic Note:\par As per chart review and clinical history, the patient has been\par under treatment for decreased B12 for several weeks. The\par morphology is consistent with myelodysplastic syndrome with\par multilineage dysplasia and ring sideroblasts. Correlation with\par FISH shows no del(5q) and cytogenetics shows trisomy 8, excluding\par the diagnosis of isolated del(5q). [employer] NGS Myeloid Disorder\par Sequencing Report detected a Tier II Variants of Potential\par Clinical Significance, ETV6 p.[id number] (Allele frequency: 29%),\par which may be seen in myelodysplasia. Given these findings the MDS\par is best classified as MDS with multilineage dysplasia and ring\par sideroblasts.\par \par Increased plasma cells are not seen and flow cytometry of the\par plasma cells shows polytypic plasma cells.\par \par  [de-identified] : He is seen today for follow up \par \par He feels good overall\par Energy stable\par \par he has received his COVID vaccines

## 2021-03-17 NOTE — ASSESSMENT
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 \par M spike 1 g/dl. 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 He has not yet seen one\par Encouraged to see urology\par \par Labs in 3 months. CBC, CMP, B12, myeloma panel, PSA\par Office visit few days later

## 2021-06-08 ENCOUNTER — APPOINTMENT (OUTPATIENT)
Dept: INTERNAL MEDICINE | Facility: CLINIC | Age: 81
End: 2021-06-08
Payer: MEDICARE

## 2021-06-08 ENCOUNTER — LABORATORY RESULT (OUTPATIENT)
Age: 81
End: 2021-06-08

## 2021-06-08 VITALS
TEMPERATURE: 97.1 F | WEIGHT: 167 LBS | OXYGEN SATURATION: 98 % | BODY MASS INDEX: 23.91 KG/M2 | HEART RATE: 76 BPM | HEIGHT: 70 IN | SYSTOLIC BLOOD PRESSURE: 140 MMHG | DIASTOLIC BLOOD PRESSURE: 80 MMHG

## 2021-06-08 PROCEDURE — 36415 COLL VENOUS BLD VENIPUNCTURE: CPT

## 2021-06-08 PROCEDURE — 99213 OFFICE O/P EST LOW 20 MIN: CPT | Mod: 25

## 2021-06-08 NOTE — HISTORY OF PRESENT ILLNESS
[FreeTextEntry1] : Patient complains of frequent urination. [de-identified] : Patient complains of nocturia 2-3 times per night. He also has frequent urination during the day. He is known to have an enlarged prostate on exam with an elevated PSA. Patient is being followed by hematology for myelodysplastic syndrome. He also has a history of diabetes. He does not check his blood sugars regularly. He otherwise feels well. He does have low back pain when he wakes up in the morning and goes away as the day progresses.

## 2021-06-08 NOTE — ASSESSMENT
[FreeTextEntry1] : Patient with nocturia and a known enlarged prostate. He has an elevated PSA as well. Will check labs including PSA. We'll start Flomax 0.4 mg h.s. Will check hemoglobin A1c and discuss with patient the end of the week.

## 2021-06-10 ENCOUNTER — RX RENEWAL (OUTPATIENT)
Age: 81
End: 2021-06-10

## 2021-06-10 LAB
ALBUMIN SERPL ELPH-MCNC: 4.3 G/DL
ALP BLD-CCNC: 137 U/L
ALT SERPL-CCNC: 5 U/L
ANION GAP SERPL CALC-SCNC: 12 MMOL/L
AST SERPL-CCNC: 13 U/L
BASOPHILS # BLD AUTO: 0.02 K/UL
BASOPHILS NFR BLD AUTO: 0.4 %
BILIRUB SERPL-MCNC: 0.3 MG/DL
BUN SERPL-MCNC: 13 MG/DL
CALCIUM SERPL-MCNC: 9.2 MG/DL
CHLORIDE SERPL-SCNC: 105 MMOL/L
CO2 SERPL-SCNC: 24 MMOL/L
CREAT SERPL-MCNC: 1.15 MG/DL
EOSINOPHIL # BLD AUTO: 0.04 K/UL
EOSINOPHIL NFR BLD AUTO: 0.8 %
ESTIMATED AVERAGE GLUCOSE: 163 MG/DL
GLUCOSE SERPL-MCNC: 139 MG/DL
HBA1C MFR BLD HPLC: 7.3 %
HCT VFR BLD CALC: 37.8 %
HGB BLD-MCNC: 11.6 G/DL
IMM GRANULOCYTES NFR BLD AUTO: 0.2 %
LYMPHOCYTES # BLD AUTO: 2.48 K/UL
LYMPHOCYTES NFR BLD AUTO: 49.4 %
MAN DIFF?: NORMAL
MCHC RBC-ENTMCNC: 28.7 PG
MCHC RBC-ENTMCNC: 30.7 GM/DL
MCV RBC AUTO: 93.6 FL
MONOCYTES # BLD AUTO: 0.45 K/UL
MONOCYTES NFR BLD AUTO: 9 %
NEUTROPHILS # BLD AUTO: 2.02 K/UL
NEUTROPHILS NFR BLD AUTO: 40.2 %
PLATELET # BLD AUTO: NORMAL K/UL
POTASSIUM SERPL-SCNC: 4.5 MMOL/L
PROT SERPL-MCNC: 7.7 G/DL
PSA SERPL-MCNC: 6.28 NG/ML
RBC # BLD: 4.04 M/UL
RBC # FLD: 16.6 %
SODIUM SERPL-SCNC: 142 MMOL/L
WBC # FLD AUTO: 5.02 K/UL

## 2021-06-15 ENCOUNTER — APPOINTMENT (OUTPATIENT)
Dept: HEMATOLOGY ONCOLOGY | Facility: CLINIC | Age: 81
End: 2021-06-15

## 2021-06-15 ENCOUNTER — RESULT REVIEW (OUTPATIENT)
Age: 81
End: 2021-06-15

## 2021-06-15 VITALS
TEMPERATURE: 97.8 F | DIASTOLIC BLOOD PRESSURE: 64 MMHG | HEIGHT: 70 IN | SYSTOLIC BLOOD PRESSURE: 135 MMHG | BODY MASS INDEX: 23.91 KG/M2 | HEART RATE: 79 BPM | RESPIRATION RATE: 18 BRPM | WEIGHT: 167 LBS | OXYGEN SATURATION: 97 %

## 2021-06-22 ENCOUNTER — APPOINTMENT (OUTPATIENT)
Dept: HEMATOLOGY ONCOLOGY | Facility: CLINIC | Age: 81
End: 2021-06-22
Payer: MEDICARE

## 2021-06-22 VITALS
BODY MASS INDEX: 23.48 KG/M2 | RESPIRATION RATE: 16 BRPM | WEIGHT: 164 LBS | DIASTOLIC BLOOD PRESSURE: 79 MMHG | HEART RATE: 65 BPM | OXYGEN SATURATION: 98 % | TEMPERATURE: 97.9 F | HEIGHT: 70 IN | SYSTOLIC BLOOD PRESSURE: 149 MMHG

## 2021-06-22 PROCEDURE — 99214 OFFICE O/P EST MOD 30 MIN: CPT

## 2021-06-22 NOTE — HISTORY OF PRESENT ILLNESS
[de-identified] : Mr. José Miguel Steven is 80 year old male here for evaluation for normocytic anemia and thrombocytopenia, referred by Dr. Samuel Johnson.\par \par Patient history of diabetes, hypertension, hyperlipidemia, enlarge prostate who has check up with his PCP Dr. Johnson with blood work as shown. \par 11/19/20  Hgb 11.6 Hct 37.1 MCV 92.8 Plts 90 - Alk phos - 138  labs low since 2017\par PSA - 4.22 ->4.84 -> 6.50 \par FHx: no hematology and/or oncology disorder\par Patient reports of doing well except for intermittent some mild minimal discomfort nocturia No hematuria or frequency. \par \par Bone marrow biopsy, bone marrow clot, and bone marrow\par aspirate\par - Myelodysplastic syndrome with multilineage dysplasia and\par ring sideroblasts\par Diagnostic Note:\par As per chart review and clinical history, the patient has been\par under treatment for decreased B12 for several weeks. The\par morphology is consistent with myelodysplastic syndrome with\par multilineage dysplasia and ring sideroblasts. Correlation with\par FISH shows no del(5q) and cytogenetics shows trisomy 8, excluding\par the diagnosis of isolated del(5q). [employer] NGS Myeloid Disorder\par Sequencing Report detected a Tier II Variants of Potential\par Clinical Significance, ETV6 p.[id number] (Allele frequency: 29%),\par which may be seen in myelodysplasia. Given these findings the MDS\par is best classified as MDS with multilineage dysplasia and ring\par sideroblasts.\par \par Increased plasma cells are not seen and flow cytometry of the\par plasma cells shows polytypic plasma cells.\par \par  [de-identified] : He is seen today for follow up \par \par Energy stable\par He co low back pain especially when he wakes up\par This has been going on for several months\par \par He has not yet seen a urologist. \par \par s/p  COVID vaccines x 2

## 2021-06-22 NOTE — ASSESSMENT
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 Energy levels good and he remains active\par Labs reviewed, analyzed and discussed\par Stable mild anemia and thrombocytopenia\par No bleeding\par Continue with observation\par \par IgG Lambda monoclonal gammopathy. \par M spike 1 g/dl. 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 CBC, CMP, B12, myeloma panel\par Office visit few days later

## 2021-09-03 ENCOUNTER — APPOINTMENT (OUTPATIENT)
Dept: UROLOGY | Facility: CLINIC | Age: 81
End: 2021-09-03
Payer: MEDICARE

## 2021-09-03 VITALS
DIASTOLIC BLOOD PRESSURE: 81 MMHG | HEART RATE: 68 BPM | TEMPERATURE: 98.5 F | RESPIRATION RATE: 18 BRPM | OXYGEN SATURATION: 98 % | SYSTOLIC BLOOD PRESSURE: 122 MMHG

## 2021-09-03 PROCEDURE — 99204 OFFICE O/P NEW MOD 45 MIN: CPT

## 2021-09-03 RX ORDER — TAMSULOSIN HYDROCHLORIDE 0.4 MG/1
0.4 CAPSULE ORAL
Qty: 30 | Refills: 3 | Status: DISCONTINUED | COMMUNITY
Start: 2021-06-08 | End: 2021-09-03

## 2021-09-03 RX ORDER — GLIPIZIDE 5 MG/1
5 TABLET, FILM COATED, EXTENDED RELEASE ORAL
Qty: 90 | Refills: 0 | Status: DISCONTINUED | COMMUNITY
Start: 2019-05-01 | End: 2021-09-03

## 2021-09-03 NOTE — ASSESSMENT
[FreeTextEntry1] : 81yo male with elevated PSA = 6.05\par PSA has been stable for last 2 years\par DIYA reveals very enlarged prostate, no nodules\par Reviewed PCa screening recommendations \par Considering his age and likely low PSA density based on prostate size, recommend observation\par F/u 6 months

## 2021-09-03 NOTE — PHYSICAL EXAM
[General Appearance - Well Developed] : well developed [General Appearance - Well Nourished] : well nourished [Normal Appearance] : normal appearance [Well Groomed] : well groomed [General Appearance - In No Acute Distress] : no acute distress [Edema] : no peripheral edema [Respiration, Rhythm And Depth] : normal respiratory rhythm and effort [Exaggerated Use Of Accessory Muscles For Inspiration] : no accessory muscle use [Abdomen Soft] : soft [Abdomen Tenderness] : non-tender [Costovertebral Angle Tenderness] : no ~M costovertebral angle tenderness [No Prostate Nodules] : no prostate nodules [Prostate Size ___ (0-4)] : prostate size [unfilled] (scale: 0-4) [Normal Station and Gait] : the gait and station were normal for the patient's age [] : no rash [No Focal Deficits] : no focal deficits [Oriented To Time, Place, And Person] : oriented to person, place, and time [Affect] : the affect was normal [Not Anxious] : not anxious [Mood] : the mood was normal

## 2021-09-03 NOTE — HISTORY OF PRESENT ILLNESS
[FreeTextEntry1] : 79yo male here for evaluation of elevated PSA > 6. No prior urological evaluation. No family history of prostate cancer. Personal history of MDS and DM II. Reports nocturia x2, no daytime urinary symptoms.\par PSA trend:\par 6/2021 6.05\par 11/2020 6.5\par 11/2019 6.84\par 9/2018 4.22\par  [Nocturia] : nocturia [Straining] : no straining [Weak Stream] : no weak stream [Weight Loss] : no recent ~M [unfilled] weight loss [None] : None

## 2021-09-21 ENCOUNTER — RESULT REVIEW (OUTPATIENT)
Age: 81
End: 2021-09-21

## 2021-09-21 ENCOUNTER — APPOINTMENT (OUTPATIENT)
Dept: HEMATOLOGY ONCOLOGY | Facility: CLINIC | Age: 81
End: 2021-09-21

## 2021-09-21 VITALS
OXYGEN SATURATION: 99 % | HEART RATE: 61 BPM | WEIGHT: 166.2 LBS | HEIGHT: 70 IN | RESPIRATION RATE: 16 BRPM | BODY MASS INDEX: 23.79 KG/M2 | DIASTOLIC BLOOD PRESSURE: 63 MMHG | TEMPERATURE: 97.2 F | SYSTOLIC BLOOD PRESSURE: 123 MMHG

## 2021-09-28 ENCOUNTER — APPOINTMENT (OUTPATIENT)
Dept: HEMATOLOGY ONCOLOGY | Facility: CLINIC | Age: 81
End: 2021-09-28
Payer: MEDICARE

## 2021-09-28 VITALS
HEART RATE: 68 BPM | HEIGHT: 70 IN | SYSTOLIC BLOOD PRESSURE: 136 MMHG | RESPIRATION RATE: 16 BRPM | BODY MASS INDEX: 24.05 KG/M2 | DIASTOLIC BLOOD PRESSURE: 73 MMHG | OXYGEN SATURATION: 97 % | WEIGHT: 168 LBS | TEMPERATURE: 98.1 F

## 2021-09-28 PROCEDURE — 99214 OFFICE O/P EST MOD 30 MIN: CPT

## 2021-09-28 NOTE — HISTORY OF PRESENT ILLNESS
[de-identified] : Mr. José Miguel Steven is 80 year old male here for evaluation for normocytic anemia and thrombocytopenia, referred by Dr. Samuel Johnson.\par \par Patient history of diabetes, hypertension, hyperlipidemia, enlarge prostate who has check up with his PCP Dr. Johnson with blood work as shown. \par 11/19/20  Hgb 11.6 Hct 37.1 MCV 92.8 Plts 90 - Alk phos - 138  labs low since 2017\par PSA - 4.22 ->4.84 -> 6.50 \par FHx: no hematology and/or oncology disorder\par Patient reports of doing well except for intermittent some mild minimal discomfort nocturia No hematuria or frequency. \par \par Bone marrow biopsy, bone marrow clot, and bone marrow\par aspirate\par - Myelodysplastic syndrome with multilineage dysplasia and\par ring sideroblasts\par Diagnostic Note:\par As per chart review and clinical history, the patient has been\par under treatment for decreased B12 for several weeks. The\par morphology is consistent with myelodysplastic syndrome with\par multilineage dysplasia and ring sideroblasts. Correlation with\par FISH shows no del(5q) and cytogenetics shows trisomy 8, excluding\par the diagnosis of isolated del(5q). [employer] NGS Myeloid Disorder\par Sequencing Report detected a Tier II Variants of Potential\par Clinical Significance, ETV6 p.[id number] (Allele frequency: 29%),\par which may be seen in myelodysplasia. Given these findings the MDS\par is best classified as MDS with multilineage dysplasia and ring\par sideroblasts.\par \par Increased plasma cells are not seen and flow cytometry of the\par plasma cells shows polytypic plasma cells.\par \par  [de-identified] : He is seen today for follow up \par \par Energy stable\par Low back pain especially when he wakes up in the AM. Subsequently it resolves\par This has been going on for several months\par \par Saw urology- was recommended close monitoring. \par \par s/p  COVID vaccines x 2

## 2021-09-28 NOTE — ASSESSMENT
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 Energy levels good and he remains active\par Labs reviewed, analyzed and discussed\par Stable mild anemia and thrombocytopenia\par No bleeding\par Continue with observation\par \par IgG Lambda monoclonal gammopathy. \par M spike 1 g/dl. 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 CBC, CMP, B12, myeloma panel\par Office visit few days later

## 2021-12-29 ENCOUNTER — RESULT REVIEW (OUTPATIENT)
Age: 81
End: 2021-12-29

## 2021-12-29 ENCOUNTER — APPOINTMENT (OUTPATIENT)
Dept: HEMATOLOGY ONCOLOGY | Facility: CLINIC | Age: 81
End: 2021-12-29

## 2022-01-05 ENCOUNTER — APPOINTMENT (OUTPATIENT)
Dept: HEMATOLOGY ONCOLOGY | Facility: CLINIC | Age: 82
End: 2022-01-05
Payer: MEDICARE

## 2022-01-19 ENCOUNTER — APPOINTMENT (OUTPATIENT)
Dept: PULMONOLOGY | Facility: CLINIC | Age: 82
End: 2022-01-19
Payer: MEDICARE

## 2022-01-19 PROCEDURE — 90662 IIV NO PRSV INCREASED AG IM: CPT

## 2022-01-19 PROCEDURE — G0008: CPT

## 2022-01-21 ENCOUNTER — APPOINTMENT (OUTPATIENT)
Dept: HEMATOLOGY ONCOLOGY | Facility: CLINIC | Age: 82
End: 2022-01-21
Payer: MEDICARE

## 2022-01-21 VITALS
HEIGHT: 70 IN | DIASTOLIC BLOOD PRESSURE: 81 MMHG | WEIGHT: 165.25 LBS | RESPIRATION RATE: 16 BRPM | OXYGEN SATURATION: 100 % | SYSTOLIC BLOOD PRESSURE: 117 MMHG | BODY MASS INDEX: 23.66 KG/M2 | HEART RATE: 86 BPM | TEMPERATURE: 96.1 F

## 2022-01-21 PROCEDURE — 99213 OFFICE O/P EST LOW 20 MIN: CPT

## 2022-01-21 NOTE — HISTORY OF PRESENT ILLNESS
[de-identified] : Mr. José Miguel Steven is 80 year old male here for evaluation for normocytic anemia and thrombocytopenia, referred by Dr. Samuel Johnson.\par \par Patient history of diabetes, hypertension, hyperlipidemia, enlarge prostate who has check up with his PCP Dr. Johnson with blood work as shown. \par 11/19/20  Hgb 11.6 Hct 37.1 MCV 92.8 Plts 90 - Alk phos - 138  labs low since 2017\par PSA - 4.22 ->4.84 -> 6.50 \par FHx: no hematology and/or oncology disorder\par Patient reports of doing well except for intermittent some mild minimal discomfort nocturia No hematuria or frequency. \par \par Bone marrow biopsy, bone marrow clot, and bone marrow\par aspirate\par - Myelodysplastic syndrome with multilineage dysplasia and\par ring sideroblasts\par Diagnostic Note:\par As per chart review and clinical history, the patient has been\par under treatment for decreased B12 for several weeks. The\par morphology is consistent with myelodysplastic syndrome with\par multilineage dysplasia and ring sideroblasts. Correlation with\par FISH shows no del(5q) and cytogenetics shows trisomy 8, excluding\par the diagnosis of isolated del(5q). [employer] NGS Myeloid Disorder\par Sequencing Report detected a Tier II Variants of Potential\par Clinical Significance, ETV6 p.[id number] (Allele frequency: 29%),\par which may be seen in myelodysplasia. Given these findings the MDS\par is best classified as MDS with multilineage dysplasia and ring\par sideroblasts.\par \par Increased plasma cells are not seen and flow cytometry of the\par plasma cells shows polytypic plasma cells.\par \par  [de-identified] : He is seen today for follow up and review labs from last week\par \par He had positive Covid infection two weeks ago with minimal symptoms and now recovered. \par He has painful callus on his right sided of his bottom foot which improved when he changed to wider shoes. \par Saw urology- was recommended close monitoring.

## 2022-01-21 NOTE — ASSESSMENT
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 Energy levels good and he remains active\par Labs reviewed, analyzed and discussed\par anemia and thrombocytopenia improved\par No bleeding\par Continue with observation\par \par IgG Lambda monoclonal gammopathy. 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 CBC, CMP, B12, myeloma panel, PSA, Ferritin \par Office visit few days later

## 2022-03-04 ENCOUNTER — APPOINTMENT (OUTPATIENT)
Dept: UROLOGY | Facility: CLINIC | Age: 82
End: 2022-03-04

## 2022-03-18 ENCOUNTER — APPOINTMENT (OUTPATIENT)
Dept: UROLOGY | Facility: CLINIC | Age: 82
End: 2022-03-18
Payer: MEDICARE

## 2022-03-18 VITALS
HEIGHT: 70 IN | BODY MASS INDEX: 22.9 KG/M2 | SYSTOLIC BLOOD PRESSURE: 150 MMHG | TEMPERATURE: 97.2 F | HEART RATE: 69 BPM | WEIGHT: 160 LBS | DIASTOLIC BLOOD PRESSURE: 83 MMHG

## 2022-03-18 PROCEDURE — 99213 OFFICE O/P EST LOW 20 MIN: CPT

## 2022-03-18 NOTE — PHYSICAL EXAM
[General Appearance - Well Developed] : well developed [General Appearance - Well Nourished] : well nourished [Normal Appearance] : normal appearance [Well Groomed] : well groomed [General Appearance - In No Acute Distress] : no acute distress [FreeTextEntry1] : DIYA 9/2021 4+ prostate, no nodules [] : no rash [Edema] : no peripheral edema [Oriented To Time, Place, And Person] : oriented to person, place, and time [Affect] : the affect was normal [Mood] : the mood was normal [Not Anxious] : not anxious [Normal Station and Gait] : the gait and station were normal for the patient's age [No Focal Deficits] : no focal deficits

## 2022-03-18 NOTE — ASSESSMENT
[FreeTextEntry1] : 82yo male with elevated PSA in 6-7 range\par PSA has been stable for last 2 years\par DIYA 9/2021 revealed very enlarged prostate, no nodules\par Reviewed PCa screening recommendations \par Considering his age and likely low PSA density based on prostate size, recommend observation\par Repeat PSA today\par F/u 6 months

## 2022-03-18 NOTE — HISTORY OF PRESENT ILLNESS
[FreeTextEntry1] : 79yo male here for evaluation of elevated PSA > 6. No prior urological evaluation. No family history of prostate cancer. Personal history of MDS and DM II. Reports nocturia x2, no daytime urinary symptoms.\par PSA trend:\par 6/2021 6.05\par 11/2020 6.5\par 11/2019 6.84\par 9/2018 4.22\par \par 3/18/22 Here for f/u. Feeling well, no current complaints.  [Nocturia] : nocturia [Weak Stream] : no weak stream [Straining] : no straining [None] : None

## 2022-03-19 LAB — PSA SERPL-MCNC: 6.43 NG/ML

## 2022-03-23 ENCOUNTER — NON-APPOINTMENT (OUTPATIENT)
Age: 82
End: 2022-03-23

## 2022-04-11 PROBLEM — Z11.59 SCREENING FOR VIRAL DISEASE: Status: ACTIVE | Noted: 2021-01-12

## 2022-05-09 RX ORDER — AMLODIPINE BESYLATE 10 MG/1
10 TABLET ORAL
Qty: 90 | Refills: 3 | Status: ACTIVE | COMMUNITY
Start: 2018-12-26 | End: 1900-01-01

## 2022-05-20 ENCOUNTER — RESULT REVIEW (OUTPATIENT)
Age: 82
End: 2022-05-20

## 2022-05-20 ENCOUNTER — APPOINTMENT (OUTPATIENT)
Dept: HEMATOLOGY ONCOLOGY | Facility: CLINIC | Age: 82
End: 2022-05-20

## 2022-05-20 VITALS
HEART RATE: 68 BPM | SYSTOLIC BLOOD PRESSURE: 129 MMHG | BODY MASS INDEX: 24.05 KG/M2 | RESPIRATION RATE: 16 BRPM | HEIGHT: 70 IN | WEIGHT: 168 LBS | DIASTOLIC BLOOD PRESSURE: 71 MMHG | OXYGEN SATURATION: 99 % | TEMPERATURE: 97.2 F

## 2022-05-27 ENCOUNTER — APPOINTMENT (OUTPATIENT)
Dept: HEMATOLOGY ONCOLOGY | Facility: CLINIC | Age: 82
End: 2022-05-27
Payer: MEDICARE

## 2022-05-27 VITALS
TEMPERATURE: 97.8 F | SYSTOLIC BLOOD PRESSURE: 147 MMHG | BODY MASS INDEX: 23.74 KG/M2 | HEART RATE: 59 BPM | RESPIRATION RATE: 16 BRPM | DIASTOLIC BLOOD PRESSURE: 78 MMHG | WEIGHT: 165.8 LBS | OXYGEN SATURATION: 99 % | HEIGHT: 70 IN

## 2022-05-27 PROCEDURE — 99214 OFFICE O/P EST MOD 30 MIN: CPT | Mod: 25

## 2022-05-27 NOTE — HISTORY OF PRESENT ILLNESS
[de-identified] : Mr. José Miguel Steven is 80 year old male here for evaluation for normocytic anemia and thrombocytopenia, referred by Dr. Samuel Johnson.\par \par Patient history of diabetes, hypertension, hyperlipidemia, enlarge prostate who has check up with his PCP Dr. Johnson with blood work as shown. \par 11/19/20  Hgb 11.6 Hct 37.1 MCV 92.8 Plts 90 - Alk phos - 138  labs low since 2017\par PSA - 4.22 ->4.84 -> 6.50 \par FHx: no hematology and/or oncology disorder\par Patient reports of doing well except for intermittent some mild minimal discomfort nocturia No hematuria or frequency. \par \par Bone marrow biopsy, bone marrow clot, and bone marrow\par aspirate\par - Myelodysplastic syndrome with multilineage dysplasia and\par ring sideroblasts\par Diagnostic Note:\par As per chart review and clinical history, the patient has been\par under treatment for decreased B12 for several weeks. The\par morphology is consistent with myelodysplastic syndrome with\par multilineage dysplasia and ring sideroblasts. Correlation with\par FISH shows no del(5q) and cytogenetics shows trisomy 8, excluding\par the diagnosis of isolated del(5q). [employer] NGS Myeloid Disorder\par Sequencing Report detected a Tier II Variants of Potential\par Clinical Significance, ETV6 p.[id number] (Allele frequency: 29%),\par which may be seen in myelodysplasia. Given these findings the MDS\par is best classified as MDS with multilineage dysplasia and ring\par sideroblasts.\par \par Increased plasma cells are not seen and flow cytometry of the\par plasma cells shows polytypic plasma cells.\par \par  [de-identified] : He is seen today for follow up and review labs from last week\par \par Patient woke up this morning with stomach aches but getting better now after taking a few tums. Denies diarrhea, bleeding, or fever. \par His energy is good, busy working on the project in the garden.

## 2022-05-27 NOTE — ASSESSMENT
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 Gudino \par Labs in 6 months. CBC, CMP, B12, myeloma panel, PSA, Ferritin \par Office visit few days later

## 2022-05-27 NOTE — RESULTS/DATA
Addended by: ROSSY ARCINIEGA on: 3/9/2022 09:49 AM     Modules accepted: Orders    
[FreeTextEntry1] : Labs reviewed, analyzed and discussed\par \par

## 2022-07-27 ENCOUNTER — APPOINTMENT (OUTPATIENT)
Dept: INTERNAL MEDICINE | Facility: CLINIC | Age: 82
End: 2022-07-27

## 2022-07-27 VITALS
HEART RATE: 75 BPM | OXYGEN SATURATION: 98 % | WEIGHT: 163 LBS | BODY MASS INDEX: 23.34 KG/M2 | HEIGHT: 70 IN | TEMPERATURE: 96.7 F | DIASTOLIC BLOOD PRESSURE: 80 MMHG | SYSTOLIC BLOOD PRESSURE: 120 MMHG

## 2022-07-27 DIAGNOSIS — D64.9 ANEMIA, UNSPECIFIED: ICD-10-CM

## 2022-07-27 PROCEDURE — 99214 OFFICE O/P EST MOD 30 MIN: CPT | Mod: 25

## 2022-07-27 PROCEDURE — 36415 COLL VENOUS BLD VENIPUNCTURE: CPT

## 2022-07-27 NOTE — PHYSICAL EXAM
[No Acute Distress] : no acute distress [Well Nourished] : well nourished [Well Developed] : well developed [Well-Appearing] : well-appearing [Normal Sclera/Conjunctiva] : normal sclera/conjunctiva [PERRL] : pupils equal round and reactive to light [EOMI] : extraocular movements intact [Normal Outer Ear/Nose] : the outer ears and nose were normal in appearance [Normal Oropharynx] : the oropharynx was normal [No JVD] : no jugular venous distention [No Lymphadenopathy] : no lymphadenopathy [Supple] : supple [Thyroid Normal, No Nodules] : the thyroid was normal and there were no nodules present [No Respiratory Distress] : no respiratory distress  [No Accessory Muscle Use] : no accessory muscle use [Clear to Auscultation] : lungs were clear to auscultation bilaterally [Normal Rate] : normal rate  [Regular Rhythm] : with a regular rhythm [Normal S1, S2] : normal S1 and S2 [No Murmur] : no murmur heard [No Carotid Bruits] : no carotid bruits [No Abdominal Bruit] : a ~M bruit was not heard ~T in the abdomen [No Varicosities] : no varicosities [Pedal Pulses Present] : the pedal pulses are present [No Edema] : there was no peripheral edema [No Palpable Aorta] : no palpable aorta [No Extremity Clubbing/Cyanosis] : no extremity clubbing/cyanosis [Soft] : abdomen soft [Non Tender] : non-tender [Non-distended] : non-distended [No Masses] : no abdominal mass palpated [No HSM] : no HSM [Normal Bowel Sounds] : normal bowel sounds [Normal Posterior Cervical Nodes] : no posterior cervical lymphadenopathy [Normal Anterior Cervical Nodes] : no anterior cervical lymphadenopathy [Grossly Normal Strength/Tone] : grossly normal strength/tone [No Focal Deficits] : no focal deficits [Normal Gait] : normal gait [Normal Affect] : the affect was normal [Normal Insight/Judgement] : insight and judgment were intact

## 2022-07-27 NOTE — ASSESSMENT
[FreeTextEntry1] : The patient needs recent labs. Will have patient call me on Monday. Patient is advised to see a podiatrist in view of his history of diabetes. He also needs to start monitoring his blood sugars at home. Will call patient on Monday and discuss labs.

## 2022-07-27 NOTE — HISTORY OF PRESENT ILLNESS
[FreeTextEntry1] : Followup diabetes [de-identified] : Patient has not seen me in quite a while. He has a history of myelodysplastic syndrome, elevated PSA and diabetes. He generally feels well. His weight is stable at 163. He does not check his blood sugars at home. Medications were reviewed. He has appointment with ophthalmology tomorrow. He does not see a podiatrist. He denies chest pain, shortness of breath or wheezing. He felt somewhat weak during the hot weather last week. He is feeling better now.

## 2022-08-12 LAB
ALBUMIN SERPL ELPH-MCNC: 4.3 G/DL
ALP BLD-CCNC: 117 U/L
ALT SERPL-CCNC: <5 U/L
ANION GAP SERPL CALC-SCNC: 9 MMOL/L
AST SERPL-CCNC: 10 U/L
BASOPHILS # BLD AUTO: 0.02 K/UL
BASOPHILS NFR BLD AUTO: 0.4 %
BILIRUB SERPL-MCNC: 0.3 MG/DL
BUN SERPL-MCNC: 11 MG/DL
CALCIUM SERPL-MCNC: 9.1 MG/DL
CHLORIDE SERPL-SCNC: 102 MMOL/L
CHOLEST SERPL-MCNC: 134 MG/DL
CO2 SERPL-SCNC: 26 MMOL/L
CREAT SERPL-MCNC: 1.15 MG/DL
EGFR: 64 ML/MIN/1.73M2
EOSINOPHIL # BLD AUTO: 0.05 K/UL
EOSINOPHIL NFR BLD AUTO: 1 %
ESTIMATED AVERAGE GLUCOSE: 171 MG/DL
GLUCOSE SERPL-MCNC: 210 MG/DL
HBA1C MFR BLD HPLC: 7.6 %
HCT VFR BLD CALC: 35.5 %
HDLC SERPL-MCNC: 39 MG/DL
HGB BLD-MCNC: 11.1 G/DL
IMM GRANULOCYTES NFR BLD AUTO: 0.2 %
LDLC SERPL CALC-MCNC: 73 MG/DL
LYMPHOCYTES # BLD AUTO: 2.51 K/UL
LYMPHOCYTES NFR BLD AUTO: 47.8 %
MAN DIFF?: NORMAL
MCHC RBC-ENTMCNC: 28.5 PG
MCHC RBC-ENTMCNC: 31.3 GM/DL
MCV RBC AUTO: 91.3 FL
MONOCYTES # BLD AUTO: 0.59 K/UL
MONOCYTES NFR BLD AUTO: 11.2 %
NEUTROPHILS # BLD AUTO: 2.07 K/UL
NEUTROPHILS NFR BLD AUTO: 39.4 %
NONHDLC SERPL-MCNC: 95 MG/DL
PLATELET # BLD AUTO: NORMAL K/UL
POTASSIUM SERPL-SCNC: 4.2 MMOL/L
PROT SERPL-MCNC: 7.9 G/DL
RBC # BLD: 3.89 M/UL
RBC # FLD: 16.4 %
SODIUM SERPL-SCNC: 137 MMOL/L
TRIGL SERPL-MCNC: 109 MG/DL
WBC # FLD AUTO: 5.25 K/UL

## 2022-09-02 ENCOUNTER — APPOINTMENT (OUTPATIENT)
Dept: UROLOGY | Facility: CLINIC | Age: 82
End: 2022-09-02

## 2022-10-14 ENCOUNTER — APPOINTMENT (OUTPATIENT)
Dept: INTERNAL MEDICINE | Facility: CLINIC | Age: 82
End: 2022-10-14

## 2022-10-14 VITALS
BODY MASS INDEX: 23.19 KG/M2 | SYSTOLIC BLOOD PRESSURE: 140 MMHG | TEMPERATURE: 97.1 F | OXYGEN SATURATION: 97 % | HEIGHT: 70 IN | HEART RATE: 87 BPM | DIASTOLIC BLOOD PRESSURE: 80 MMHG | WEIGHT: 162 LBS

## 2022-10-14 PROCEDURE — G0008: CPT

## 2022-10-14 PROCEDURE — 90662 IIV NO PRSV INCREASED AG IM: CPT

## 2022-10-14 PROCEDURE — 99213 OFFICE O/P EST LOW 20 MIN: CPT | Mod: 25

## 2022-10-14 NOTE — HISTORY OF PRESENT ILLNESS
[FreeTextEntry1] : Medication renewal [de-identified] : Patient comes in asking him why we did not refill atorvastatin. Patient was started on jardiance on August 12. However he never picked up the prescription. I wonder if this patient is becoming a little forgetful. I've discussed his medications with him and again recommend that he picked up his medication and start the jardiance for diabetes.

## 2022-10-14 NOTE — ASSESSMENT
[FreeTextEntry1] : Patient's medications were renewed and I have recommended he take his medications carefully

## 2022-12-02 ENCOUNTER — RESULT REVIEW (OUTPATIENT)
Age: 82
End: 2022-12-02

## 2022-12-02 ENCOUNTER — APPOINTMENT (OUTPATIENT)
Dept: HEMATOLOGY ONCOLOGY | Facility: CLINIC | Age: 82
End: 2022-12-02

## 2022-12-02 VITALS
BODY MASS INDEX: 24.05 KG/M2 | WEIGHT: 168 LBS | OXYGEN SATURATION: 97 % | TEMPERATURE: 97.6 F | RESPIRATION RATE: 16 BRPM | DIASTOLIC BLOOD PRESSURE: 76 MMHG | HEART RATE: 82 BPM | SYSTOLIC BLOOD PRESSURE: 143 MMHG | HEIGHT: 70 IN

## 2022-12-09 ENCOUNTER — APPOINTMENT (OUTPATIENT)
Dept: HEMATOLOGY ONCOLOGY | Facility: CLINIC | Age: 82
End: 2022-12-09

## 2022-12-09 VITALS
WEIGHT: 167.38 LBS | HEIGHT: 70 IN | RESPIRATION RATE: 16 BRPM | BODY MASS INDEX: 23.96 KG/M2 | SYSTOLIC BLOOD PRESSURE: 122 MMHG | DIASTOLIC BLOOD PRESSURE: 76 MMHG | OXYGEN SATURATION: 100 % | HEART RATE: 75 BPM | TEMPERATURE: 97.3 F

## 2022-12-09 PROCEDURE — 99214 OFFICE O/P EST MOD 30 MIN: CPT | Mod: 25

## 2022-12-09 NOTE — HISTORY OF PRESENT ILLNESS
[de-identified] : Mr. José Miguel Steven is 80 year old male here for evaluation for normocytic anemia and thrombocytopenia, referred by Dr. Samuel Johnson.\par \par Patient history of diabetes, hypertension, hyperlipidemia, enlarge prostate who has check up with his PCP Dr. Johnson with blood work as shown. \par 11/19/20  Hgb 11.6 Hct 37.1 MCV 92.8 Plts 90 - Alk phos - 138  labs low since 2017\par PSA - 4.22 ->4.84 -> 6.50 \par FHx: no hematology and/or oncology disorder\par Patient reports of doing well except for intermittent some mild minimal discomfort nocturia No hematuria or frequency. \par \par Bone marrow biopsy, bone marrow clot, and bone marrow\par aspirate\par - Myelodysplastic syndrome with multilineage dysplasia and\par ring sideroblasts\par Diagnostic Note:\par As per chart review and clinical history, the patient has been\par under treatment for decreased B12 for several weeks. The\par morphology is consistent with myelodysplastic syndrome with\par multilineage dysplasia and ring sideroblasts. Correlation with\par FISH shows no del(5q) and cytogenetics shows trisomy 8, excluding\par the diagnosis of isolated del(5q). [employer] NGS Myeloid Disorder\par Sequencing Report detected a Tier II Variants of Potential\par Clinical Significance, ETV6 p.[id number] (Allele frequency: 29%),\par which may be seen in myelodysplasia. Given these findings the MDS\par is best classified as MDS with multilineage dysplasia and ring\par sideroblasts.\par \par Increased plasma cells are not seen and flow cytometry of the\par plasma cells shows polytypic plasma cells.\par \par  [de-identified] : He is seen today for follow up and review labs from last week\par \par He feels good overall\par Got his flu and covid booster. few days later, she has some flu like symptoms\par

## 2022-12-09 NOTE — ASSESSMENT
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 He does not want to commute to [facility] to see Dr House\par Refer to Dr Funez/Dr Lobo\par \par Labs in 6 months. CBC, CMP, B12, myeloma panel, PSA, Ferritin \par Office visit few days later

## 2023-01-13 ENCOUNTER — APPOINTMENT (OUTPATIENT)
Dept: INTERNAL MEDICINE | Facility: CLINIC | Age: 83
End: 2023-01-13

## 2023-06-02 ENCOUNTER — RESULT REVIEW (OUTPATIENT)
Age: 83
End: 2023-06-02

## 2023-06-02 ENCOUNTER — APPOINTMENT (OUTPATIENT)
Dept: HEMATOLOGY ONCOLOGY | Facility: CLINIC | Age: 83
End: 2023-06-02

## 2023-06-02 VITALS
HEART RATE: 75 BPM | HEIGHT: 70 IN | WEIGHT: 166 LBS | DIASTOLIC BLOOD PRESSURE: 65 MMHG | TEMPERATURE: 97.5 F | OXYGEN SATURATION: 97 % | SYSTOLIC BLOOD PRESSURE: 120 MMHG | BODY MASS INDEX: 23.77 KG/M2 | RESPIRATION RATE: 17 BRPM

## 2023-06-09 ENCOUNTER — APPOINTMENT (OUTPATIENT)
Dept: HEMATOLOGY ONCOLOGY | Facility: CLINIC | Age: 83
End: 2023-06-09
Payer: MEDICARE

## 2023-06-09 VITALS
RESPIRATION RATE: 17 BRPM | BODY MASS INDEX: 23.49 KG/M2 | HEART RATE: 65 BPM | HEIGHT: 70 IN | SYSTOLIC BLOOD PRESSURE: 141 MMHG | OXYGEN SATURATION: 98 % | DIASTOLIC BLOOD PRESSURE: 80 MMHG | WEIGHT: 164.06 LBS | TEMPERATURE: 96.8 F

## 2023-06-09 PROCEDURE — 99214 OFFICE O/P EST MOD 30 MIN: CPT | Mod: 25

## 2023-06-09 RX ORDER — EMPAGLIFLOZIN 10 MG/1
10 TABLET, FILM COATED ORAL DAILY
Qty: 90 | Refills: 3 | Status: DISCONTINUED | COMMUNITY
Start: 2022-08-12 | End: 2023-06-09

## 2023-06-09 NOTE — ASSESSMENT
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 \par Labs reviewed, analyzed, and discussed\par Hgb and Plts remain the same \par continues to closely monitor. \par \par #IIgG Lambda monoclonal gammopathy. 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 Gudino \par Labs in 6 months. CBC, CMP, B12, myeloma panel, PSA, Ferritin \par Office visit few days later

## 2023-06-09 NOTE — HISTORY OF PRESENT ILLNESS
[de-identified] : Mr. José Miguel Steven is 80 year old male here for evaluation for normocytic anemia and thrombocytopenia, referred by Dr. Samuel Johnson.\par \par Patient history of diabetes, hypertension, hyperlipidemia, enlarge prostate who has check up with his PCP Dr. Johnson with blood work as shown. \par 11/19/20  Hgb 11.6 Hct 37.1 MCV 92.8 Plts 90 - Alk phos - 138  labs low since 2017\par PSA - 4.22 ->4.84 -> 6.50 \par FHx: no hematology and/or oncology disorder\par Patient reports of doing well except for intermittent some mild minimal discomfort nocturia No hematuria or frequency. \par \par Bone marrow biopsy, bone marrow clot, and bone marrow\par aspirate\par - Myelodysplastic syndrome with multilineage dysplasia and\par ring sideroblasts\par Diagnostic Note:\par As per chart review and clinical history, the patient has been\par under treatment for decreased B12 for several weeks. The\par morphology is consistent with myelodysplastic syndrome with\par multilineage dysplasia and ring sideroblasts. Correlation with\par FISH shows no del(5q) and cytogenetics shows trisomy 8, excluding\par the diagnosis of isolated del(5q). [employer] NGS Myeloid Disorder\par Sequencing Report detected a Tier II Variants of Potential\par Clinical Significance, ETV6 p.[id number] (Allele frequency: 29%),\par which may be seen in myelodysplasia. Given these findings the MDS\par is best classified as MDS with multilineage dysplasia and ring\par sideroblasts.\par \par Increased plasma cells are not seen and flow cytometry of the\par plasma cells shows polytypic plasma cells.\par \par  [de-identified] : He is seen today for follow up and review labs from last week\par \par he has chronic back pain - currently asymptomatic. \par

## 2023-07-25 ENCOUNTER — RX RENEWAL (OUTPATIENT)
Age: 83
End: 2023-07-25

## 2023-11-07 ENCOUNTER — APPOINTMENT (OUTPATIENT)
Dept: FAMILY MEDICINE | Facility: CLINIC | Age: 83
End: 2023-11-07
Payer: MEDICARE

## 2023-11-07 VITALS
BODY MASS INDEX: 22.19 KG/M2 | HEART RATE: 71 BPM | HEIGHT: 70 IN | WEIGHT: 155 LBS | DIASTOLIC BLOOD PRESSURE: 70 MMHG | OXYGEN SATURATION: 99 % | SYSTOLIC BLOOD PRESSURE: 110 MMHG

## 2023-11-07 DIAGNOSIS — Z76.89 PERSONS ENCOUNTERING HEALTH SERVICES IN OTHER SPECIFIED CIRCUMSTANCES: ICD-10-CM

## 2023-11-07 DIAGNOSIS — Z12.11 ENCOUNTER FOR SCREENING FOR MALIGNANT NEOPLASM OF COLON: ICD-10-CM

## 2023-11-07 DIAGNOSIS — Z23 ENCOUNTER FOR IMMUNIZATION: ICD-10-CM

## 2023-11-07 DIAGNOSIS — Z12.12 ENCOUNTER FOR SCREENING FOR MALIGNANT NEOPLASM OF COLON: ICD-10-CM

## 2023-11-07 PROCEDURE — 90662 IIV NO PRSV INCREASED AG IM: CPT

## 2023-11-07 PROCEDURE — G0008: CPT

## 2023-11-07 PROCEDURE — 99215 OFFICE O/P EST HI 40 MIN: CPT | Mod: 25

## 2023-11-07 PROCEDURE — 36415 COLL VENOUS BLD VENIPUNCTURE: CPT

## 2023-11-07 RX ORDER — ATORVASTATIN CALCIUM 10 MG/1
10 TABLET, FILM COATED ORAL DAILY
Qty: 3 | Refills: 0 | Status: DISCONTINUED | COMMUNITY
End: 2023-11-07

## 2023-11-07 RX ORDER — METFORMIN HYDROCHLORIDE 500 MG/1
500 TABLET, COATED ORAL TWICE DAILY
Qty: 360 | Refills: 3 | Status: DISCONTINUED | COMMUNITY
Start: 2023-01-17 | End: 2023-11-07

## 2023-11-08 LAB
ALBUMIN SERPL ELPH-MCNC: 4.4 G/DL
ALP BLD-CCNC: 125 U/L
ALT SERPL-CCNC: 9 U/L
ANION GAP SERPL CALC-SCNC: 9 MMOL/L
AST SERPL-CCNC: 19 U/L
BILIRUB SERPL-MCNC: 0.3 MG/DL
BUN SERPL-MCNC: 13 MG/DL
CALCIUM SERPL-MCNC: 9.5 MG/DL
CHLORIDE SERPL-SCNC: 100 MMOL/L
CHOLEST SERPL-MCNC: 152 MG/DL
CO2 SERPL-SCNC: 28 MMOL/L
CREAT SERPL-MCNC: 1.06 MG/DL
CREAT SPEC-SCNC: 20 MG/DL
EGFR: 70 ML/MIN/1.73M2
ESTIMATED AVERAGE GLUCOSE: 151 MG/DL
GLUCOSE SERPL-MCNC: 104 MG/DL
HBA1C MFR BLD HPLC: 6.9 %
HDLC SERPL-MCNC: 51 MG/DL
LDLC SERPL CALC-MCNC: 86 MG/DL
MICROALBUMIN 24H UR DL<=1MG/L-MCNC: <1.2 MG/DL
MICROALBUMIN/CREAT 24H UR-RTO: NORMAL MG/G
NONHDLC SERPL-MCNC: 101 MG/DL
POTASSIUM SERPL-SCNC: 4.7 MMOL/L
PROT SERPL-MCNC: 8.6 G/DL
SODIUM SERPL-SCNC: 137 MMOL/L
TRIGL SERPL-MCNC: 79 MG/DL

## 2023-11-08 RX ORDER — ATORVASTATIN CALCIUM 10 MG/1
10 TABLET, FILM COATED ORAL DAILY
Qty: 90 | Refills: 3 | Status: ACTIVE | COMMUNITY
Start: 2022-10-14 | End: 1900-01-01

## 2023-12-08 ENCOUNTER — RESULT REVIEW (OUTPATIENT)
Age: 83
End: 2023-12-08

## 2023-12-08 ENCOUNTER — APPOINTMENT (OUTPATIENT)
Dept: HEMATOLOGY ONCOLOGY | Facility: CLINIC | Age: 83
End: 2023-12-08

## 2023-12-08 VITALS
OXYGEN SATURATION: 99 % | HEART RATE: 81 BPM | HEIGHT: 70 IN | WEIGHT: 160.5 LBS | TEMPERATURE: 97 F | RESPIRATION RATE: 16 BRPM | SYSTOLIC BLOOD PRESSURE: 130 MMHG | DIASTOLIC BLOOD PRESSURE: 75 MMHG | BODY MASS INDEX: 22.98 KG/M2

## 2023-12-11 ENCOUNTER — NON-APPOINTMENT (OUTPATIENT)
Age: 83
End: 2023-12-11

## 2023-12-15 ENCOUNTER — APPOINTMENT (OUTPATIENT)
Dept: HEMATOLOGY ONCOLOGY | Facility: CLINIC | Age: 83
End: 2023-12-15
Payer: MEDICARE

## 2023-12-15 VITALS
RESPIRATION RATE: 13 BRPM | HEART RATE: 65 BPM | TEMPERATURE: 97.6 F | OXYGEN SATURATION: 98 % | WEIGHT: 159.56 LBS | BODY MASS INDEX: 22.84 KG/M2 | SYSTOLIC BLOOD PRESSURE: 125 MMHG | DIASTOLIC BLOOD PRESSURE: 70 MMHG | HEIGHT: 70 IN

## 2023-12-15 PROCEDURE — 99214 OFFICE O/P EST MOD 30 MIN: CPT | Mod: 25

## 2023-12-15 NOTE — HISTORY OF PRESENT ILLNESS
[de-identified] : Mr. José Miguel Steven is 80 year old male here for evaluation for normocytic anemia and thrombocytopenia, referred by Dr. Samuel Johnson.\par  \par  Patient history of diabetes, hypertension, hyperlipidemia, enlarge prostate who has check up with his PCP Dr. Johnson with blood work as shown. \par  11/19/20  Hgb 11.6 Hct 37.1 MCV 92.8 Plts 90 - Alk phos - 138  labs low since 2017\par  PSA - 4.22 ->4.84 -> 6.50 \par  FHx: no hematology and/or oncology disorder\par  Patient reports of doing well except for intermittent some mild minimal discomfort nocturia No hematuria or frequency. \par  \par  Bone marrow biopsy, bone marrow clot, and bone marrow\par  aspirate\par  - Myelodysplastic syndrome with multilineage dysplasia and\par  ring sideroblasts\par  Diagnostic Note:\par  As per chart review and clinical history, the patient has been\par  under treatment for decreased B12 for several weeks. The\par  morphology is consistent with myelodysplastic syndrome with\par  multilineage dysplasia and ring sideroblasts. Correlation with\par  FISH shows no del(5q) and cytogenetics shows trisomy 8, excluding\par  the diagnosis of isolated del(5q). [employer] NGS Myeloid Disorder\par  Sequencing Report detected a Tier II Variants of Potential\par  Clinical Significance, ETV6 p.[id number] (Allele frequency: 29%),\par  which may be seen in myelodysplasia. Given these findings the MDS\par  is best classified as MDS with multilineage dysplasia and ring\par  sideroblasts.\par  \par  Increased plasma cells are not seen and flow cytometry of the\par  plasma cells shows polytypic plasma cells.\par  \par   [de-identified] : He is seen today for follow up and review labs from last week  Patient is doing well,  Had his Flu, Covid and RSV vaccine in Nov 2023.

## 2023-12-15 NOTE — ASSESSMENT
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  Labs reviewed, analyzed, and discussed Hgb and Plts  stable without any changes, now change visit to every 6 months but he understands he'll reach out to us if any problems arise in the mean time.   #IIgG Lambda monoclonal gammopathy. 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 Terese Labs in 6 months. CBC, CMP, B12, myeloma panel, PSA, Ferritin Office visit few days later.

## 2024-01-05 ENCOUNTER — APPOINTMENT (OUTPATIENT)
Dept: FAMILY MEDICINE | Facility: CLINIC | Age: 84
End: 2024-01-05
Payer: MEDICARE

## 2024-01-05 VITALS
WEIGHT: 158 LBS | HEART RATE: 74 BPM | BODY MASS INDEX: 22.62 KG/M2 | OXYGEN SATURATION: 98 % | SYSTOLIC BLOOD PRESSURE: 120 MMHG | HEIGHT: 70 IN | DIASTOLIC BLOOD PRESSURE: 70 MMHG

## 2024-01-05 DIAGNOSIS — Z00.00 ENCOUNTER FOR GENERAL ADULT MEDICAL EXAMINATION W/OUT ABNORMAL FINDINGS: ICD-10-CM

## 2024-01-05 DIAGNOSIS — Z13.29 ENCOUNTER FOR SCREENING FOR OTHER SUSPECTED ENDOCRINE DISORDER: ICD-10-CM

## 2024-01-05 DIAGNOSIS — R74.8 ABNORMAL LEVELS OF OTHER SERUM ENZYMES: ICD-10-CM

## 2024-01-05 PROCEDURE — G0439: CPT

## 2024-01-05 PROCEDURE — 99213 OFFICE O/P EST LOW 20 MIN: CPT | Mod: 25

## 2024-01-05 PROCEDURE — 36415 COLL VENOUS BLD VENIPUNCTURE: CPT

## 2024-01-05 RX ORDER — TAMSULOSIN HYDROCHLORIDE 0.4 MG/1
0.4 CAPSULE ORAL
Qty: 90 | Refills: 1 | Status: ACTIVE | COMMUNITY
Start: 2024-01-05 | End: 1900-01-01

## 2024-01-05 NOTE — PHYSICAL EXAM
ambulatory [No Acute Distress] : no acute distress [Well-Appearing] : well-appearing [Normal Voice/Communication] : normal voice/communication [EOMI] : extraocular movements intact [Normal TMs] : both tympanic membranes were normal [Supple] : supple [No Respiratory Distress] : no respiratory distress  [No Accessory Muscle Use] : no accessory muscle use [Clear to Auscultation] : lungs were clear to auscultation bilaterally [Normal Rate] : normal rate  [Regular Rhythm] : with a regular rhythm [Normal S1, S2] : normal S1 and S2 [No Edema] : there was no peripheral edema [Soft] : abdomen soft [Non Tender] : non-tender [Non-distended] : non-distended [Normal Bowel Sounds] : normal bowel sounds [Coordination Grossly Intact] : coordination grossly intact

## 2024-01-05 NOTE — HEALTH RISK ASSESSMENT
[Very Good] : ~his/her~  mood as very good [No] : No [No falls in past year] : Patient reported no falls in the past year [0] : 2) Feeling down, depressed, or hopeless: Not at all (0) [PHQ-2 Negative - No further assessment needed] : PHQ-2 Negative - No further assessment needed [No Retinopathy] : No retinopathy [Patient reported colonoscopy was normal] : Patient reported colonoscopy was normal [HIV test declined] : HIV test declined [Hepatitis C test declined] : Hepatitis C test declined [None] : None [With Family] : lives with family [# of Members in Household ___] :  household currently consist of [unfilled] member(s) [Retired] : retired [] :  [# Of Children ___] : has [unfilled] children [Feels Safe at Home] : Feels safe at home [Fully functional (bathing, dressing, toileting, transferring, walking, feeding)] : Fully functional (bathing, dressing, toileting, transferring, walking, feeding) [Fully functional (using the telephone, shopping, preparing meals, housekeeping, doing laundry, using] : Fully functional and needs no help or supervision to perform IADLs (using the telephone, shopping, preparing meals, housekeeping, doing laundry, using transportation, managing medications and managing finances) [Reports normal functional visual acuity (ie: able to read med bottle)] : Reports normal functional visual acuity [Smoke Detector] : smoke detector [Carbon Monoxide Detector] : carbon monoxide detector [Safety elements used in home] : safety elements used in home [Never] : Never [de-identified] : works around house [de-identified] : good [[id number]] : 0 [EyeExamDate] : 01/19 [Change in mental status noted] : No change in mental status noted [Language] : denies difficulty with language [Behavior] : denies difficulty with behavior [Learning/Retaining New Information] : denies difficulty learning/retaining new information [Handling Complex Tasks] : denies difficulty handling complex tasks [Reasoning] : denies difficulty with reasoning [Spatial Ability and Orientation] : denies difficulty with spatial ability and orientation [Reports changes in hearing] : Reports no changes in hearing [Reports changes in vision] : Reports no changes in vision [ColonoscopyDate] :  01/15 [AdvancecareDate] : 01/05/2024

## 2024-01-05 NOTE — HISTORY OF PRESENT ILLNESS
[FreeTextEntry1] : CPE [de-identified] : 83 year old M  presents for CPE Pt is feeling well today - no acute complaints Had good holiday with the family  Lives w/ wife. Has 1 daughter and 2 grandchildren. Feels safe at home. Work: retired, former  1960s-1990s PSA elevated: needs referral and name of urologist, c/o frequent urination at night, no dysuria or hematuria; agreeable to try flomax Agreeable for lab work;

## 2024-01-08 LAB
ALBUMIN SERPL ELPH-MCNC: 4.2 G/DL
ALP BLD-CCNC: 122 U/L
ALT SERPL-CCNC: 6 U/L
AST SERPL-CCNC: 14 U/L
BILIRUB DIRECT SERPL-MCNC: 0.1 MG/DL
BILIRUB INDIRECT SERPL-MCNC: 0.2 MG/DL
BILIRUB SERPL-MCNC: 0.3 MG/DL
CREAT SPEC-SCNC: 247 MG/DL
ESTIMATED AVERAGE GLUCOSE: 157 MG/DL
GGT SERPL-CCNC: 25 U/L
HBA1C MFR BLD HPLC: 7.1 %
MICROALBUMIN 24H UR DL<=1MG/L-MCNC: 3.5 MG/DL
MICROALBUMIN/CREAT 24H UR-RTO: 14 MG/G
PROT SERPL-MCNC: 7.1 G/DL
TSH SERPL-ACNC: 1.31 UIU/ML

## 2024-01-23 ENCOUNTER — APPOINTMENT (OUTPATIENT)
Dept: GASTROENTEROLOGY | Facility: CLINIC | Age: 84
End: 2024-01-23
Payer: MEDICARE

## 2024-01-23 DIAGNOSIS — K76.0 FATTY (CHANGE OF) LIVER, NOT ELSEWHERE CLASSIFIED: ICD-10-CM

## 2024-01-23 DIAGNOSIS — Z12.11 ENCOUNTER FOR SCREENING FOR MALIGNANT NEOPLASM OF COLON: ICD-10-CM

## 2024-01-23 DIAGNOSIS — Z86.010 PERSONAL HISTORY OF COLONIC POLYPS: ICD-10-CM

## 2024-01-23 DIAGNOSIS — K57.30 DIVERTICULOSIS OF LARGE INTESTINE W/OUT PERFORATION OR ABSCESS W/OUT BLEEDING: ICD-10-CM

## 2024-01-23 DIAGNOSIS — K64.8 OTHER HEMORRHOIDS: ICD-10-CM

## 2024-01-23 PROCEDURE — 99204 OFFICE O/P NEW MOD 45 MIN: CPT

## 2024-03-28 ENCOUNTER — RESULT REVIEW (OUTPATIENT)
Age: 84
End: 2024-03-28

## 2024-03-29 ENCOUNTER — APPOINTMENT (OUTPATIENT)
Dept: GASTROENTEROLOGY | Facility: HOSPITAL | Age: 84
End: 2024-03-29

## 2024-04-18 ENCOUNTER — APPOINTMENT (OUTPATIENT)
Dept: UROLOGY | Facility: CLINIC | Age: 84
End: 2024-04-18
Payer: MEDICARE

## 2024-04-18 VITALS
WEIGHT: 158 LBS | SYSTOLIC BLOOD PRESSURE: 124 MMHG | HEIGHT: 70 IN | DIASTOLIC BLOOD PRESSURE: 73 MMHG | BODY MASS INDEX: 22.62 KG/M2 | HEART RATE: 90 BPM

## 2024-04-18 PROCEDURE — 99203 OFFICE O/P NEW LOW 30 MIN: CPT

## 2024-04-18 PROCEDURE — 99213 OFFICE O/P EST LOW 20 MIN: CPT

## 2024-04-18 NOTE — PHYSICAL EXAM
[General Appearance - Well Developed] : well developed [Normal Appearance] : normal appearance [General Appearance - In No Acute Distress] : no acute distress [Respiration, Rhythm And Depth] : normal respiratory rhythm and effort [Abdomen Soft] : soft [Abdomen Hernia] : no hernia was discovered [Urethral Meatus] : meatus normal [Penis Abnormality] : normal circumcised penis [Scrotum] : the scrotum was normal [Epididymis] : the epididymides were normal [Testes Tenderness] : no tenderness of the testes [Testes Mass (___cm)] : there were no testicular masses [Anus Abnormality] : the anus and perineum were normal [Rectal Exam - Rectum] : digital rectal exam was normal [Prostate Tenderness] : the prostate was not tender [No Prostate Nodules] : no prostate nodules [Prostate Size ___ gm] : prostate size [unfilled] gm [] : no rash [Inguinal Lymph Nodes Enlarged Bilaterally] : inguinal

## 2024-04-18 NOTE — ASSESSMENT
[FreeTextEntry1] : Patient is a 83-year-old male referred to me for elevated PSA.  His PSAs have always been around 6-7 over the last few years but in November 2023 went up to 8.65.  He has no voiding changes, hematuria, hematospermia or dysuria.  He has no family history of prostate cancer.  He is being followed by oncology for myelodysplastic disorder.  His physical exam today shows a large but smooth prostate.  Assessment and plan 1.  Elevated PSA 2.  BPH DIYA is smooth and we will get a free and total PSA today and call him with the results.  Follow-up will be dependent upon those results.

## 2024-04-24 LAB
PSA FREE FLD-MCNC: 22 %
PSA FREE SERPL-MCNC: 1.96 NG/ML
PSA SERPL-MCNC: 9.04 NG/ML

## 2024-05-04 RX ORDER — METFORMIN HYDROCHLORIDE 500 MG/1
500 TABLET, COATED ORAL
Qty: 360 | Refills: 0 | Status: ACTIVE | COMMUNITY
Start: 2021-06-10 | End: 1900-01-01

## 2024-05-14 ENCOUNTER — APPOINTMENT (OUTPATIENT)
Dept: FAMILY MEDICINE | Facility: CLINIC | Age: 84
End: 2024-05-14
Payer: MEDICARE

## 2024-05-14 VITALS
BODY MASS INDEX: 23.05 KG/M2 | OXYGEN SATURATION: 99 % | SYSTOLIC BLOOD PRESSURE: 100 MMHG | DIASTOLIC BLOOD PRESSURE: 60 MMHG | HEIGHT: 70 IN | WEIGHT: 161 LBS | HEART RATE: 82 BPM

## 2024-05-14 DIAGNOSIS — E11.39 TYPE 2 DIABETES MELLITUS WITH OTHER DIABETIC OPHTHALMIC COMPLICATION: ICD-10-CM

## 2024-05-14 DIAGNOSIS — E78.5 HYPERLIPIDEMIA, UNSPECIFIED: ICD-10-CM

## 2024-05-14 PROCEDURE — 99214 OFFICE O/P EST MOD 30 MIN: CPT

## 2024-05-14 PROCEDURE — G2211 COMPLEX E/M VISIT ADD ON: CPT

## 2024-05-14 PROCEDURE — 36415 COLL VENOUS BLD VENIPUNCTURE: CPT

## 2024-05-14 NOTE — REVIEW OF SYSTEMS
[Fever] : no fever [Chills] : no chills [Fatigue] : no fatigue [Night Sweats] : no night sweats [Chest Pain] : no chest pain [Palpitations] : no palpitations [Shortness Of Breath] : no shortness of breath [Wheezing] : no wheezing [Cough] : no cough [Abdominal Pain] : no abdominal pain [Nausea] : no nausea [Constipation] : no constipation [Vomiting] : no vomiting [Dysuria] : no dysuria [Incontinence] : no incontinence [Hematuria] : no hematuria [Frequency] : no frequency [Headache] : no headache

## 2024-05-14 NOTE — HISTORY OF PRESENT ILLNESS
[FreeTextEntry1] : f/u chronic conditions [de-identified] : 83 year old M present for f/u chronic conditions Feeling well today - no other acute concerns. HTN: BP stable on CCB; Denies any HA, vision change, chest discomfort, palpitations HLD: tolerating statin  DM: taking metformin as directed  BPH: takes flomax as directed; sees Uro for elevated PSA  Agreeable for lab work

## 2024-05-14 NOTE — HEALTH RISK ASSESSMENT
[No] : No [No falls in past year] : Patient reported no falls in the past year [0] : 2) Feeling down, depressed, or hopeless: Not at all (0) [PHQ-2 Negative - No further assessment needed] : PHQ-2 Negative - No further assessment needed [Never] : Never [Audit-CScore] : 0 [de-identified] : works around house [de-identified] : good [[id number]] : 0 [AdvancecareDate] : 5/14/2024

## 2024-05-14 NOTE — PHYSICAL EXAM
[No Acute Distress] : no acute distress [Well-Appearing] : well-appearing [Normal Voice/Communication] : normal voice/communication [EOMI] : extraocular movements intact [No Respiratory Distress] : no respiratory distress  [No Accessory Muscle Use] : no accessory muscle use [Clear to Auscultation] : lungs were clear to auscultation bilaterally [Normal Rate] : normal rate  [Regular Rhythm] : with a regular rhythm [Normal S1, S2] : normal S1 and S2 [No Edema] : there was no peripheral edema [Soft] : abdomen soft [Non Tender] : non-tender [Non-distended] : non-distended [Normal Bowel Sounds] : normal bowel sounds [Coordination Grossly Intact] : coordination grossly intact

## 2024-05-15 LAB
ALBUMIN SERPL ELPH-MCNC: 4.2 G/DL
ALP BLD-CCNC: 121 U/L
ALT SERPL-CCNC: 5 U/L
ANION GAP SERPL CALC-SCNC: 10 MMOL/L
AST SERPL-CCNC: 16 U/L
BILIRUB SERPL-MCNC: 0.2 MG/DL
BUN SERPL-MCNC: 10 MG/DL
CALCIUM SERPL-MCNC: 8.8 MG/DL
CHLORIDE SERPL-SCNC: 103 MMOL/L
CHOLEST SERPL-MCNC: 146 MG/DL
CO2 SERPL-SCNC: 26 MMOL/L
CREAT SERPL-MCNC: 1.24 MG/DL
CREAT SPEC-SCNC: 67 MG/DL
EGFR: 58 ML/MIN/1.73M2
ESTIMATED AVERAGE GLUCOSE: 146 MG/DL
GLUCOSE SERPL-MCNC: 155 MG/DL
HBA1C MFR BLD HPLC: 6.7 %
HDLC SERPL-MCNC: 43 MG/DL
LDLC SERPL CALC-MCNC: 77 MG/DL
MICROALBUMIN 24H UR DL<=1MG/L-MCNC: <1.2 MG/DL
MICROALBUMIN/CREAT 24H UR-RTO: NORMAL MG/G
NONHDLC SERPL-MCNC: 103 MG/DL
POTASSIUM SERPL-SCNC: 4.2 MMOL/L
PROT SERPL-MCNC: 7.3 G/DL
SODIUM SERPL-SCNC: 139 MMOL/L
TRIGL SERPL-MCNC: 146 MG/DL

## 2024-05-29 ENCOUNTER — APPOINTMENT (OUTPATIENT)
Dept: OPHTHALMOLOGY | Facility: CLINIC | Age: 84
End: 2024-05-29
Payer: MEDICARE

## 2024-05-29 ENCOUNTER — NON-APPOINTMENT (OUTPATIENT)
Age: 84
End: 2024-05-29

## 2024-05-29 PROCEDURE — 76514 ECHO EXAM OF EYE THICKNESS: CPT

## 2024-05-29 PROCEDURE — 92004 COMPRE OPH EXAM NEW PT 1/>: CPT

## 2024-05-29 PROCEDURE — 92020 GONIOSCOPY: CPT

## 2024-05-29 PROCEDURE — 92134 CPTRZ OPH DX IMG PST SGM RTA: CPT

## 2024-05-31 ENCOUNTER — APPOINTMENT (OUTPATIENT)
Dept: NEPHROLOGY | Facility: CLINIC | Age: 84
End: 2024-05-31
Payer: MEDICARE

## 2024-05-31 VITALS
SYSTOLIC BLOOD PRESSURE: 90 MMHG | HEART RATE: 84 BPM | HEIGHT: 70 IN | BODY MASS INDEX: 23.05 KG/M2 | WEIGHT: 161 LBS | DIASTOLIC BLOOD PRESSURE: 60 MMHG | OXYGEN SATURATION: 96 %

## 2024-05-31 DIAGNOSIS — N40.0 BENIGN PROSTATIC HYPERPLASIA WITHOUT LOWER URINARY TRACT SYMPMS: ICD-10-CM

## 2024-05-31 DIAGNOSIS — R94.4 ABNORMAL RESULTS OF KIDNEY FUNCTION STUDIES: ICD-10-CM

## 2024-05-31 DIAGNOSIS — I10 ESSENTIAL (PRIMARY) HYPERTENSION: ICD-10-CM

## 2024-05-31 PROCEDURE — 99204 OFFICE O/P NEW MOD 45 MIN: CPT

## 2024-05-31 NOTE — HISTORY OF PRESENT ILLNESS
[FreeTextEntry1] : Patient has hx of HTN, DM, MDS, lambda light chain monoclonal gammopathy, Patient is following with Dr. Pardo.  Kidney function has been stable until recently, serum creatinine increases to 1.2 from baseline ~1 and egfr 58. No known prior kidney disease.  Endorsed intermittent use of nsaid but denies starting new medication.  BP is soft at the visit , no complaints, does not monitor bp at home.  Has previous lab with elevated serum free light chain ration 1.1, serum protein electrophoresis with persistent Mspike  Bone marrow biopsy reportedly Increased plasma cells are not seen, and flow cytometry of the plasma cells shows polytypic plasma cells.

## 2024-05-31 NOTE — PHYSICAL EXAM
[General Appearance - Alert] : alert [General Appearance - In No Acute Distress] : in no acute distress [] : no respiratory distress [Respiration, Rhythm And Depth] : normal respiratory rhythm and effort [Exaggerated Use Of Accessory Muscles For Inspiration] : no accessory muscle use [Auscultation Breath Sounds / Voice Sounds] : lungs were clear to auscultation bilaterally [Heart Sounds] : normal S1 and S2 [Abdomen Soft] : soft [Abdomen Tenderness] : non-tender [Abnormal Walk] : normal gait [Oriented To Time, Place, And Person] : oriented to person, place, and time [FreeTextEntry1] : Trace b/l LE edema

## 2024-05-31 NOTE — REASON FOR VISIT
[Initial Evaluation] : an initial evaluation [FreeTextEntry1] : decrease egfr. Referred by his PCP, Dr. Kaur.

## 2024-05-31 NOTE — ASSESSMENT
[FreeTextEntry1] : # Decrease egfr /CKD  hx of lambda light chain monoclonal gammopathy , HTN, DM  scr 1.2 baseline 1.0-1.1, egfr 58  - Avoid nephrotoxic med, dc nsaid .  increase hydration - lab cystatin c, UA, UPCR, SFLC, SPEP, immunofixation    # HTN  BP 90/60, no complaints  decrease Norvasc 5 mg daily  home bp monitoring, bring log at the visit    #BPH  No LUTS ,  cont Flomax 0.4 mg daily   # DM  A1.c 6.7 on metformin 500 mg bid  cont fu with pcp

## 2024-06-03 ENCOUNTER — APPOINTMENT (OUTPATIENT)
Dept: PODIATRY | Facility: CLINIC | Age: 84
End: 2024-06-03
Payer: MEDICARE

## 2024-06-03 VITALS
BODY MASS INDEX: 22.54 KG/M2 | WEIGHT: 161 LBS | SYSTOLIC BLOOD PRESSURE: 125 MMHG | HEIGHT: 71 IN | DIASTOLIC BLOOD PRESSURE: 76 MMHG | TEMPERATURE: 97.5 F

## 2024-06-03 DIAGNOSIS — L85.1 ACQUIRED KERATOSIS [KERATODERMA] PALMARIS ET PLANTARIS: ICD-10-CM

## 2024-06-03 DIAGNOSIS — B35.1 TINEA UNGUIUM: ICD-10-CM

## 2024-06-03 DIAGNOSIS — M79.672 PAIN IN LEFT FOOT: ICD-10-CM

## 2024-06-03 DIAGNOSIS — E11.9 TYPE 2 DIABETES MELLITUS W/OUT COMPLICATIONS: ICD-10-CM

## 2024-06-03 DIAGNOSIS — I70.91 GENERALIZED ATHEROSCLEROSIS: ICD-10-CM

## 2024-06-03 DIAGNOSIS — M79.671 PAIN IN RIGHT FOOT: ICD-10-CM

## 2024-06-03 PROCEDURE — 11721 DEBRIDE NAIL 6 OR MORE: CPT | Mod: 59

## 2024-06-03 PROCEDURE — 99203 OFFICE O/P NEW LOW 30 MIN: CPT | Mod: 25

## 2024-06-03 PROCEDURE — 11057 PARNG/CUTG B9 HYPRKR LES >4: CPT | Mod: Q8

## 2024-06-03 NOTE — PROCEDURE
[FreeTextEntry1] : A lengthy discussion with the patient regarding diabetes and the manifestations second occur in the feet. The discussion included but was not limited to nail care, treatment of open wound, treatment of calluses, shoe gear, as well as statistics regarding diabetes as it relates to loss of toe, toes, midfoot, as well as mortality Re: diabetics wind up with a midfoot amputation, transmetatarsal amputation, as well as 50 percent of diabetics who suffer loss of the leg suffering mortality within 5 years. Educational literature was dispensed. Overall diabetic education as it regard to the foot was discussed with all questions asked and answered appropriately. I've advised the patient should there be any concerns regarding nails, infection, open wounds, interdigital problems, or acute injury that they should contact the office immediately. Using sterile instrumentation debridement of all nails manually and electrically to decrease thickness, pain and girth and make shoe gear more comfortable with "slant back" procedure of any bordering spicules causing pain  Utilizing a scalpel and sterile aseptic technique sharp debridement of multiple hyperkeratotic lesions ( more than 4) was performed. Appropriate padding applied were necessary. I lengthy discussion with the patient today regarding hygiene and foot health. My discussion to focus mainly on prevention of pathology and remaining proactive. My discussion included but was not limited to overall foot health, foot hygiene, the risks of walking barefoot especially in public places and the need to be conscious of the cleanliness of facilities where not only in a walk barefoot but other people walk barefoot as well. All questions were asked and answered appropriately and educational literature was dispensed

## 2024-06-03 NOTE — PHYSICAL EXAM
[General Appearance - Alert] : alert [General Appearance - In No Acute Distress] : in no acute distress [FreeTextEntry3] : The vascular exam reveals decreased pedal pulses bilateral, a capillary fill time of 3-5 seconds,  mild atrophic skin changes, mild varicosities absence of hair growth, but no cyanosis, clubbing or mottling seen. [de-identified] : overall muscle strength testing is decreased, but consistant with the patients age and medical problems. Mild atrophy and overall weakness present. [FreeTextEntry1] : The patient has all contributing factors to onychomycosis including but not limited to thickness, subungual debris, discoloration and partial lysis and they are brittle when cut. Painful hyperkeratotic lesions noted ipk sub 5 both, sub 3 both, outside by Naomi muller both [Sensation] : the sensory exam was normal to light touch and pinprick [No Focal Deficits] : no focal deficits [Deep Tendon Reflexes (DTR)] : deep tendon reflexes were 2+ and symmetric [Motor Exam] : the motor exam was normal [Oriented To Time, Place, And Person] : oriented to person, place, and time [Impaired Insight] : insight and judgment were intact [Affect] : the affect was normal

## 2024-06-03 NOTE — HISTORY OF PRESENT ILLNESS
[FreeTextEntry1] : The above-named patient was referred here as a new patient for evaluation and management of diabetes as well as a complete podiatric evaluation regarding diabetes and their lower extremity. An overall podiatric evaluation and management is requested

## 2024-06-05 LAB
APPEARANCE: CLEAR
BACTERIA: NEGATIVE /HPF
BILIRUBIN URINE: NEGATIVE
BLOOD URINE: NEGATIVE
CAST: 0 /LPF
COLOR: YELLOW
CREAT SPEC-SCNC: 63 MG/DL
CREAT/PROT UR: 0.1 RATIO
CYSTATIN C SERPL-MCNC: 1.11 MG/L
DEPRECATED KAPPA LC FREE/LAMBDA SER: 0.93 RATIO
EPITHELIAL CELLS: 0 /HPF
GFR/BSA.PRED SERPLBLD CYS-BASED-ARV: 62 ML/MIN/1.73M2
GLUCOSE QUALITATIVE U: NEGATIVE MG/DL
IGA SER QL IEP: 88 MG/DL
IGG SER QL IEP: 1252 MG/DL
IGM SER QL IEP: 113 MG/DL
KAPPA LC CSF-MCNC: 2.52 MG/DL
KAPPA LC SERPL-MCNC: 2.35 MG/DL
KETONES URINE: NEGATIVE MG/DL
LEUKOCYTE ESTERASE URINE: NEGATIVE
M PROTEIN SPEC IFE-MCNC: NORMAL
MICROSCOPIC-UA: NORMAL
NITRITE URINE: NEGATIVE
PH URINE: 7
PROT UR-MCNC: 8 MG/DL
PROTEIN URINE: NEGATIVE MG/DL
RED BLOOD CELLS URINE: 0 /HPF
SPECIFIC GRAVITY URINE: 1.01
UROBILINOGEN URINE: 0.2 MG/DL
WHITE BLOOD CELLS URINE: 0 /HPF

## 2024-06-14 ENCOUNTER — RESULT REVIEW (OUTPATIENT)
Age: 84
End: 2024-06-14

## 2024-06-14 ENCOUNTER — APPOINTMENT (OUTPATIENT)
Dept: HEMATOLOGY ONCOLOGY | Facility: CLINIC | Age: 84
End: 2024-06-14

## 2024-06-14 VITALS
BODY MASS INDEX: 22.15 KG/M2 | HEIGHT: 71 IN | DIASTOLIC BLOOD PRESSURE: 82 MMHG | HEART RATE: 77 BPM | SYSTOLIC BLOOD PRESSURE: 137 MMHG | OXYGEN SATURATION: 98 % | TEMPERATURE: 97.6 F | RESPIRATION RATE: 16 BRPM | WEIGHT: 158.19 LBS

## 2024-06-21 ENCOUNTER — APPOINTMENT (OUTPATIENT)
Dept: HEMATOLOGY ONCOLOGY | Facility: CLINIC | Age: 84
End: 2024-06-21
Payer: MEDICARE

## 2024-06-21 VITALS
HEIGHT: 71 IN | WEIGHT: 159.19 LBS | RESPIRATION RATE: 16 BRPM | BODY MASS INDEX: 22.29 KG/M2 | HEART RATE: 82 BPM | SYSTOLIC BLOOD PRESSURE: 111 MMHG | TEMPERATURE: 97.7 F | DIASTOLIC BLOOD PRESSURE: 71 MMHG | OXYGEN SATURATION: 98 %

## 2024-06-21 DIAGNOSIS — D47.2 MONOCLONAL GAMMOPATHY: ICD-10-CM

## 2024-06-21 DIAGNOSIS — D69.6 THROMBOCYTOPENIA, UNSPECIFIED: ICD-10-CM

## 2024-06-21 DIAGNOSIS — R97.20 ELEVATED PROSTATE, SPECIFIC ANTIGEN [PSA]: ICD-10-CM

## 2024-06-21 DIAGNOSIS — D46.9 MYELODYSPLASTIC SYNDROME, UNSPECIFIED: ICD-10-CM

## 2024-06-21 PROCEDURE — G2211 COMPLEX E/M VISIT ADD ON: CPT

## 2024-06-21 PROCEDURE — 99214 OFFICE O/P EST MOD 30 MIN: CPT

## 2024-06-21 NOTE — ASSESSMENT
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  Labs reviewed, analyzed, and discussed Hgb and Plts  stable without any significant changes Aranesp as needed for hemoglobin less than 10 Follow-up in 3 months.  He prefers to want to still come every 6 months or sooner only if needed  #IIgG Lambda monoclonal gammopathy. 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 CBC, CMP, B12, myeloma panel, PSA, Ferritin Office visit few days later.

## 2024-06-21 NOTE — HISTORY OF PRESENT ILLNESS
[de-identified] : Mr. José Miguel Steven is 80 year old male here for evaluation for normocytic anemia and thrombocytopenia, referred by Dr. Samuel Johnson.  Patient history of diabetes, hypertension, hyperlipidemia, enlarge prostate who has check up with his PCP Dr. Johnson with blood work as shown.  11/19/20  Hgb 11.6 Hct 37.1 MCV 92.8 Plts 90 - Alk phos - 138  labs low since 2017 PSA - 4.22 ->4.84 -> 6.50  FHx: no hematology and/or oncology disorder Patient reports of doing well except for intermittent some mild minimal discomfort nocturia No hematuria or frequency.   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 The morphology is consistent with myelodysplastic syndrome with multilineage dysplasia and ring sideroblasts. Correlation with FISH shows no del(5q) and cytogenetics shows trisomy 8, excluding the diagnosis of isolated del(5q). [employer] NGS Myeloid Disorder Sequencing Report detected a Tier II Variants of Potential Clinical Significance, ETV6 p.[id number] (Allele frequency: 29%), which may be seen in myelodysplasia. Given these findings the MDS is best classified as MDS with multilineage dysplasia and ring sideroblasts.  Increased plasma cells are not seen and flow cytometry of the plasma cells shows polytypic plasma cells.  Had his Flu, Covid and RSV vaccine in Nov 2023.   [de-identified] : He is seen today for follow up and review labs from last week  Patient is doing well,  No new symptoms

## 2024-07-01 ENCOUNTER — RESULT REVIEW (OUTPATIENT)
Age: 84
End: 2024-07-01

## 2024-07-17 ENCOUNTER — APPOINTMENT (OUTPATIENT)
Dept: UROLOGY | Facility: CLINIC | Age: 84
End: 2024-07-17
Payer: MEDICARE

## 2024-07-17 PROCEDURE — 99441: CPT | Mod: 93

## 2024-08-05 PROBLEM — Z76.89 ENCOUNTER FOR BIOPSY: Status: ACTIVE | Noted: 2024-08-05

## 2024-08-07 ENCOUNTER — OUTPATIENT (OUTPATIENT)
Dept: OUTPATIENT SERVICES | Facility: HOSPITAL | Age: 84
LOS: 1 days | End: 2024-08-07
Payer: MEDICARE

## 2024-08-07 DIAGNOSIS — R97.20 ELEVATED PROSTATE SPECIFIC ANTIGEN [PSA]: ICD-10-CM

## 2024-08-07 PROCEDURE — C8001: CPT

## 2024-08-08 ENCOUNTER — NON-APPOINTMENT (OUTPATIENT)
Age: 84
End: 2024-08-08

## 2024-08-14 ENCOUNTER — APPOINTMENT (OUTPATIENT)
Dept: UROLOGY | Facility: CLINIC | Age: 84
End: 2024-08-14

## 2024-08-23 ENCOUNTER — APPOINTMENT (OUTPATIENT)
Dept: NEPHROLOGY | Facility: CLINIC | Age: 84
End: 2024-08-23

## 2024-08-30 ENCOUNTER — APPOINTMENT (OUTPATIENT)
Dept: NEPHROLOGY | Facility: CLINIC | Age: 84
End: 2024-08-30
Payer: MEDICARE

## 2024-08-30 DIAGNOSIS — R94.4 ABNORMAL RESULTS OF KIDNEY FUNCTION STUDIES: ICD-10-CM

## 2024-08-30 PROCEDURE — 36415 COLL VENOUS BLD VENIPUNCTURE: CPT

## 2024-09-03 ENCOUNTER — APPOINTMENT (OUTPATIENT)
Dept: NEPHROLOGY | Facility: CLINIC | Age: 84
End: 2024-09-03
Payer: MEDICARE

## 2024-09-03 VITALS
HEART RATE: 87 BPM | HEIGHT: 71 IN | SYSTOLIC BLOOD PRESSURE: 124 MMHG | BODY MASS INDEX: 21.42 KG/M2 | WEIGHT: 153 LBS | OXYGEN SATURATION: 97 % | DIASTOLIC BLOOD PRESSURE: 70 MMHG

## 2024-09-03 DIAGNOSIS — N40.0 BENIGN PROSTATIC HYPERPLASIA WITHOUT LOWER URINARY TRACT SYMPMS: ICD-10-CM

## 2024-09-03 DIAGNOSIS — D46.9 MYELODYSPLASTIC SYNDROME, UNSPECIFIED: ICD-10-CM

## 2024-09-03 DIAGNOSIS — E11.39 TYPE 2 DIABETES MELLITUS WITH OTHER DIABETIC OPHTHALMIC COMPLICATION: ICD-10-CM

## 2024-09-03 DIAGNOSIS — I10 ESSENTIAL (PRIMARY) HYPERTENSION: ICD-10-CM

## 2024-09-03 DIAGNOSIS — R80.8 OTHER PROTEINURIA: ICD-10-CM

## 2024-09-03 DIAGNOSIS — N18.2 CHRONIC KIDNEY DISEASE, STAGE 2 (MILD): ICD-10-CM

## 2024-09-03 DIAGNOSIS — N17.9 ACUTE KIDNEY FAILURE, UNSPECIFIED: ICD-10-CM

## 2024-09-03 DIAGNOSIS — N18.9 ACUTE KIDNEY FAILURE, UNSPECIFIED: ICD-10-CM

## 2024-09-03 LAB
ALBUMIN SERPL ELPH-MCNC: 4.1 G/DL
ALP BLD-CCNC: 101 U/L
ALT SERPL-CCNC: 6 U/L
ANION GAP SERPL CALC-SCNC: 11 MMOL/L
APPEARANCE: CLEAR
AST SERPL-CCNC: 16 U/L
BACTERIA: NEGATIVE /HPF
BASOPHILS # BLD AUTO: 0.02 K/UL
BASOPHILS NFR BLD AUTO: 0.4 %
BILIRUB SERPL-MCNC: 0.3 MG/DL
BILIRUBIN URINE: NEGATIVE
BLOOD URINE: NEGATIVE
BUN SERPL-MCNC: 14 MG/DL
CALCIUM SERPL-MCNC: 9.3 MG/DL
CAST: 0 /LPF
CHLORIDE SERPL-SCNC: 102 MMOL/L
CO2 SERPL-SCNC: 24 MMOL/L
COLOR: YELLOW
CREAT SERPL-MCNC: 1.04 MG/DL
CREAT SPEC-SCNC: 32 MG/DL
CREAT/PROT UR: 0.3 RATIO
CYSTATIN C SERPL-MCNC: 1.09 MG/L
EGFR: 71 ML/MIN/1.73M2
EOSINOPHIL # BLD AUTO: 0.03 K/UL
EOSINOPHIL NFR BLD AUTO: 0.6 %
EPITHELIAL CELLS: 0 /HPF
GFR/BSA.PRED SERPLBLD CYS-BASED-ARV: 63 ML/MIN/1.73M2
GLUCOSE QUALITATIVE U: NEGATIVE MG/DL
GLUCOSE SERPL-MCNC: 115 MG/DL
HCT VFR BLD CALC: 33.8 %
HGB BLD-MCNC: 10.8 G/DL
IMM GRANULOCYTES NFR BLD AUTO: 0.2 %
KETONES URINE: NEGATIVE MG/DL
LEUKOCYTE ESTERASE URINE: NEGATIVE
LYMPHOCYTES # BLD AUTO: 2.36 K/UL
LYMPHOCYTES NFR BLD AUTO: 45 %
MAN DIFF?: NORMAL
MCHC RBC-ENTMCNC: 28.4 PG
MCHC RBC-ENTMCNC: 32 GM/DL
MCV RBC AUTO: 88.9 FL
MICROSCOPIC-UA: NORMAL
MONOCYTES # BLD AUTO: 0.56 K/UL
MONOCYTES NFR BLD AUTO: 10.7 %
NEUTROPHILS # BLD AUTO: 2.27 K/UL
NEUTROPHILS NFR BLD AUTO: 43.1 %
NITRITE URINE: NEGATIVE
PH URINE: 7.5
PLATELET # BLD AUTO: 81 K/UL
POTASSIUM SERPL-SCNC: 4.6 MMOL/L
PROT SERPL-MCNC: 8 G/DL
PROT UR-MCNC: 9 MG/DL
PROTEIN URINE: NEGATIVE MG/DL
RBC # BLD: 3.8 M/UL
RBC # FLD: 16.8 %
RED BLOOD CELLS URINE: 0 /HPF
SODIUM SERPL-SCNC: 137 MMOL/L
SPECIFIC GRAVITY URINE: 1
UROBILINOGEN URINE: 0.2 MG/DL
WBC # FLD AUTO: 5.25 K/UL
WHITE BLOOD CELLS URINE: 0 /HPF

## 2024-09-03 PROCEDURE — 99214 OFFICE O/P EST MOD 30 MIN: CPT

## 2024-09-03 NOTE — HISTORY OF PRESENT ILLNESS
[FreeTextEntry1] : Patient has hx of HTN, DM, MDS, lambda light chain monoclonal gammopathy, Patient is following with Dr. Pardo.  Kidney function has been stable until recently, serum creatinine increases to 1.2 from baseline ~1 and egfr 58. No known prior kidney disease.  Endorsed intermittent use of nsaid but denies starting new medication.  BP is soft at the visit , no complaints, does not monitor bp at home.  Has previous lab with elevated serum free light chain ration 1.1, serum protein electrophoresis with persistent Mspike  Bone marrow biopsy reportedly Increased plasma cells are not seen, and flow cytometry of the plasma cells shows polytypic plasma cells.  9/3 Lab reviewed and discussed with patient.  Kidney function improving, sCr 1.04, cystatin c egfr 63 ml/mn.

## 2024-09-03 NOTE — ASSESSMENT
Fluence (Will Not Render If 0): 20 [FreeTextEntry1] : # DOMINIQUE on ckd stage 2 improving hx of lambda light chain monoclonal gammopathy, HTN, DM  -scr 1.04 from 1.4 baseline 1.0-1.1. cystatin c egfr 63 ml/mn - Avoid nephrotoxic med, cont to avoid nsaid and increase hydration. report intermittent diarrhea. Has scheduled prostate biopsy, will get Bactrim for 3 days.  -upcr 0.3 g, sub nephrotic, kidney function improving, will monitor.    # HTN  -BP controlled -Norvasc 5 mg daily  -home bp monitoring, bring log at the visit    #BPH  # HI PSA, MRI with concerning prostate lesion. will go for prostate biopsy this month.  -No LUTS ,  -cont Flomax 0.4 mg daily  - cont fu w/ urolgy  # DM  A1.c 6.7 on metformin 500 mg bid  cont fu with pcp  External Cooling Fan Speed: 0 Tolerated Procedure (Optional): Tolerated Well Spot Size: 15 mm Treatment Number: 5 Shaving (Optional): The patient shaved at home Consent: Written consent obtained, risks reviewed including but not limited to crusting, scabbing, blistering, scarring, darker or lighter pigmentary change, paradoxical hair regrowth, incomplete removal of hair and infection. Post-Procedure Care: Immediate endpoint: perifollicular erythema and edema. Vaseline and ice applied. Post care reviewed with patient. Detail Level: Zone Location Override: breast/abdomen Laser Type: Alexandrite 755nm Anesthesia Type: 1% lidocaine with epinephrine Post-Care Instructions: I reviewed with the patient in detail post-care instructions. Patient should avoid sun for a minimum of 4 weeks before and after treatment. Pulse Duration: 20 ms Number Of Prepaid Treatments (Will Not Render If 0): 8 Total Pulses: 190 Pre-Procedure: Prior to proceeding the treatment areas were cleaned and all present put on their eye protection. Render Post-Care In The Note: No

## 2024-09-03 NOTE — REVIEW OF SYSTEMS
[Diarrhea] : diarrhea [Fever] : no fever [Chills] : no chills [Chest Pain] : no chest pain [Palpitations] : no palpitations [Shortness Of Breath] : no shortness of breath [Wheezing] : no wheezing [Cough] : no cough [SOB on Exertion] : no shortness of breath during exertion [Constipation] : no constipation [Dysuria] : no dysuria [Hesitancy] : no urinary hesitancy [Nocturia] : no nocturia [Dizziness] : no dizziness

## 2024-09-11 ENCOUNTER — APPOINTMENT (OUTPATIENT)
Dept: UROLOGY | Facility: CLINIC | Age: 84
End: 2024-09-11

## 2024-09-30 ENCOUNTER — APPOINTMENT (OUTPATIENT)
Dept: NEPHROLOGY | Facility: CLINIC | Age: 84
End: 2024-09-30

## 2024-10-02 RX ORDER — SULFAMETHOXAZOLE AND TRIMETHOPRIM 800; 160 MG/1; MG/1
800-160 TABLET ORAL
Qty: 6 | Refills: 0 | Status: ACTIVE | COMMUNITY
Start: 2024-10-02 | End: 1900-01-01

## 2024-10-02 RX ORDER — DIAZEPAM 5 MG/1
5 TABLET ORAL
Qty: 1 | Refills: 0 | Status: ACTIVE | COMMUNITY
Start: 2024-10-02 | End: 1900-01-01

## 2024-10-09 ENCOUNTER — RESULT REVIEW (OUTPATIENT)
Age: 84
End: 2024-10-09

## 2024-10-09 ENCOUNTER — APPOINTMENT (OUTPATIENT)
Dept: UROLOGY | Facility: CLINIC | Age: 84
End: 2024-10-09
Payer: MEDICARE

## 2024-10-09 VITALS — SYSTOLIC BLOOD PRESSURE: 138 MMHG | DIASTOLIC BLOOD PRESSURE: 84 MMHG | HEART RATE: 70 BPM

## 2024-10-09 VITALS
BODY MASS INDEX: 21.42 KG/M2 | WEIGHT: 153 LBS | SYSTOLIC BLOOD PRESSURE: 123 MMHG | HEIGHT: 71 IN | DIASTOLIC BLOOD PRESSURE: 78 MMHG | HEART RATE: 71 BPM

## 2024-10-09 PROCEDURE — 76999F: CUSTOM

## 2024-10-09 PROCEDURE — 55700: CPT

## 2024-10-16 ENCOUNTER — APPOINTMENT (OUTPATIENT)
Dept: UROLOGY | Facility: CLINIC | Age: 84
End: 2024-10-16
Payer: MEDICARE

## 2024-10-16 DIAGNOSIS — C61 MALIGNANT NEOPLASM OF PROSTATE: ICD-10-CM

## 2024-10-16 PROCEDURE — 99442: CPT | Mod: 93

## 2024-11-18 ENCOUNTER — RX RENEWAL (OUTPATIENT)
Age: 84
End: 2024-11-18

## 2024-12-12 ENCOUNTER — APPOINTMENT (OUTPATIENT)
Dept: FAMILY MEDICINE | Facility: CLINIC | Age: 84
End: 2024-12-12
Payer: MEDICARE

## 2024-12-12 VITALS
BODY MASS INDEX: 21.76 KG/M2 | DIASTOLIC BLOOD PRESSURE: 70 MMHG | HEIGHT: 70 IN | WEIGHT: 152 LBS | SYSTOLIC BLOOD PRESSURE: 120 MMHG | OXYGEN SATURATION: 100 % | HEART RATE: 56 BPM

## 2024-12-12 DIAGNOSIS — E11.39 TYPE 2 DIABETES MELLITUS WITH OTHER DIABETIC OPHTHALMIC COMPLICATION: ICD-10-CM

## 2024-12-12 DIAGNOSIS — N18.2 CHRONIC KIDNEY DISEASE, STAGE 2 (MILD): ICD-10-CM

## 2024-12-12 DIAGNOSIS — I10 ESSENTIAL (PRIMARY) HYPERTENSION: ICD-10-CM

## 2024-12-12 DIAGNOSIS — E78.5 HYPERLIPIDEMIA, UNSPECIFIED: ICD-10-CM

## 2024-12-12 DIAGNOSIS — K76.0 FATTY (CHANGE OF) LIVER, NOT ELSEWHERE CLASSIFIED: ICD-10-CM

## 2024-12-12 DIAGNOSIS — C61 MALIGNANT NEOPLASM OF PROSTATE: ICD-10-CM

## 2024-12-12 PROCEDURE — 36415 COLL VENOUS BLD VENIPUNCTURE: CPT

## 2024-12-12 PROCEDURE — 99214 OFFICE O/P EST MOD 30 MIN: CPT

## 2024-12-13 ENCOUNTER — RESULT REVIEW (OUTPATIENT)
Age: 84
End: 2024-12-13

## 2024-12-13 ENCOUNTER — APPOINTMENT (OUTPATIENT)
Dept: HEMATOLOGY ONCOLOGY | Facility: CLINIC | Age: 84
End: 2024-12-13

## 2024-12-13 VITALS
WEIGHT: 155 LBS | HEART RATE: 64 BPM | OXYGEN SATURATION: 97 % | RESPIRATION RATE: 16 BRPM | DIASTOLIC BLOOD PRESSURE: 71 MMHG | BODY MASS INDEX: 21.7 KG/M2 | TEMPERATURE: 97.7 F | SYSTOLIC BLOOD PRESSURE: 134 MMHG | HEIGHT: 71 IN

## 2024-12-23 ENCOUNTER — APPOINTMENT (OUTPATIENT)
Dept: RADIATION ONCOLOGY | Facility: CLINIC | Age: 84
End: 2024-12-23
Payer: MEDICARE

## 2024-12-23 DIAGNOSIS — C61 MALIGNANT NEOPLASM OF PROSTATE: ICD-10-CM

## 2024-12-23 PROCEDURE — 99205 OFFICE O/P NEW HI 60 MIN: CPT

## 2024-12-23 PROCEDURE — G2211 COMPLEX E/M VISIT ADD ON: CPT

## 2024-12-30 ENCOUNTER — APPOINTMENT (OUTPATIENT)
Dept: NEPHROLOGY | Facility: CLINIC | Age: 84
End: 2024-12-30
Payer: MEDICARE

## 2024-12-30 DIAGNOSIS — R94.4 ABNORMAL RESULTS OF KIDNEY FUNCTION STUDIES: ICD-10-CM

## 2024-12-30 DIAGNOSIS — Z76.89 PERSONS ENCOUNTERING HEALTH SERVICES IN OTHER SPECIFIED CIRCUMSTANCES: ICD-10-CM

## 2024-12-30 DIAGNOSIS — R97.20 ELEVATED PROSTATE, SPECIFIC ANTIGEN [PSA]: ICD-10-CM

## 2024-12-30 DIAGNOSIS — K57.30 DIVERTICULOSIS OF LARGE INTESTINE W/OUT PERFORATION OR ABSCESS W/OUT BLEEDING: ICD-10-CM

## 2024-12-30 DIAGNOSIS — R80.8 OTHER PROTEINURIA: ICD-10-CM

## 2024-12-30 DIAGNOSIS — E11.39 TYPE 2 DIABETES MELLITUS WITH OTHER DIABETIC OPHTHALMIC COMPLICATION: ICD-10-CM

## 2024-12-30 PROCEDURE — 36415 COLL VENOUS BLD VENIPUNCTURE: CPT

## 2025-01-02 LAB
ALBUMIN SERPL ELPH-MCNC: 4 G/DL
ALP BLD-CCNC: 105 U/L
ALT SERPL-CCNC: 5 U/L
ANION GAP SERPL CALC-SCNC: 7 MMOL/L
APPEARANCE: CLEAR
AST SERPL-CCNC: 15 U/L
BACTERIA: NEGATIVE /HPF
BASOPHILS # BLD AUTO: 0.03 K/UL
BASOPHILS NFR BLD AUTO: 0.5 %
BILIRUB SERPL-MCNC: 0.2 MG/DL
BILIRUBIN URINE: NEGATIVE
BLOOD URINE: NEGATIVE
BUN SERPL-MCNC: 15 MG/DL
CALCIUM SERPL-MCNC: 9.6 MG/DL
CAST: 0 /LPF
CHLORIDE SERPL-SCNC: 106 MMOL/L
CO2 SERPL-SCNC: 29 MMOL/L
COLOR: YELLOW
CREAT SERPL-MCNC: 1.25 MG/DL
CREAT SPEC-SCNC: 24 MG/DL
CREAT/PROT UR: 0.5 RATIO
CYSTATIN C SERPL-MCNC: 1.2 MG/L
EGFR: 57 ML/MIN/1.73M2
EOSINOPHIL # BLD AUTO: 0.06 K/UL
EOSINOPHIL NFR BLD AUTO: 1 %
EPITHELIAL CELLS: 0 /HPF
GFR/BSA.PRED SERPLBLD CYS-BASED-ARV: 56 ML/MIN/1.73M2
GLUCOSE QUALITATIVE U: NEGATIVE MG/DL
GLUCOSE SERPL-MCNC: 114 MG/DL
HCT VFR BLD CALC: 34.3 %
HGB BLD-MCNC: 10.7 G/DL
IMM GRANULOCYTES NFR BLD AUTO: 0.2 %
KETONES URINE: NEGATIVE MG/DL
LEUKOCYTE ESTERASE URINE: ABNORMAL
LYMPHOCYTES # BLD AUTO: 3.2 K/UL
LYMPHOCYTES NFR BLD AUTO: 52.3 %
MAN DIFF?: NORMAL
MCHC RBC-ENTMCNC: 28.5 PG
MCHC RBC-ENTMCNC: 31.2 G/DL
MCV RBC AUTO: 91.5 FL
MICROSCOPIC-UA: NORMAL
MONOCYTES # BLD AUTO: 0.61 K/UL
MONOCYTES NFR BLD AUTO: 10 %
NEUTROPHILS # BLD AUTO: 2.21 K/UL
NEUTROPHILS NFR BLD AUTO: 36 %
NITRITE URINE: NEGATIVE
PH URINE: 8.5
PLATELET # BLD AUTO: 83 K/UL
POTASSIUM SERPL-SCNC: 5.4 MMOL/L
PROT SERPL-MCNC: 7.7 G/DL
PROT UR-MCNC: 13 MG/DL
PROTEIN URINE: NEGATIVE MG/DL
RBC # BLD: 3.75 M/UL
RBC # FLD: 17.1 %
RED BLOOD CELLS URINE: 0 /HPF
SODIUM SERPL-SCNC: 142 MMOL/L
SPECIFIC GRAVITY URINE: 1
UROBILINOGEN URINE: 0.2 MG/DL
WBC # FLD AUTO: 6.12 K/UL
WHITE BLOOD CELLS URINE: 0 /HPF

## 2025-01-07 ENCOUNTER — APPOINTMENT (OUTPATIENT)
Dept: NEPHROLOGY | Facility: CLINIC | Age: 85
End: 2025-01-07
Payer: MEDICARE

## 2025-01-07 VITALS
SYSTOLIC BLOOD PRESSURE: 112 MMHG | WEIGHT: 150 LBS | OXYGEN SATURATION: 98 % | BODY MASS INDEX: 21 KG/M2 | HEIGHT: 71 IN | DIASTOLIC BLOOD PRESSURE: 60 MMHG | HEART RATE: 70 BPM

## 2025-01-07 DIAGNOSIS — N18.30 CHRONIC KIDNEY DISEASE, STAGE 3 UNSPECIFIED: ICD-10-CM

## 2025-01-07 DIAGNOSIS — D64.9 ANEMIA, UNSPECIFIED: ICD-10-CM

## 2025-01-07 DIAGNOSIS — I10 ESSENTIAL (PRIMARY) HYPERTENSION: ICD-10-CM

## 2025-01-07 PROCEDURE — 99214 OFFICE O/P EST MOD 30 MIN: CPT

## 2025-01-09 ENCOUNTER — APPOINTMENT (OUTPATIENT)
Dept: HEMATOLOGY ONCOLOGY | Facility: CLINIC | Age: 85
End: 2025-01-09
Payer: MEDICARE

## 2025-01-09 VITALS
SYSTOLIC BLOOD PRESSURE: 142 MMHG | HEIGHT: 71 IN | DIASTOLIC BLOOD PRESSURE: 67 MMHG | OXYGEN SATURATION: 99 % | RESPIRATION RATE: 16 BRPM | TEMPERATURE: 97.3 F | BODY MASS INDEX: 21.76 KG/M2 | WEIGHT: 155.44 LBS | HEART RATE: 74 BPM

## 2025-01-09 DIAGNOSIS — D46.9 MYELODYSPLASTIC SYNDROME, UNSPECIFIED: ICD-10-CM

## 2025-01-09 DIAGNOSIS — C61 MALIGNANT NEOPLASM OF PROSTATE: ICD-10-CM

## 2025-01-09 DIAGNOSIS — D47.2 MONOCLONAL GAMMOPATHY: ICD-10-CM

## 2025-01-09 PROCEDURE — 99215 OFFICE O/P EST HI 40 MIN: CPT

## 2025-01-09 PROCEDURE — G2211 COMPLEX E/M VISIT ADD ON: CPT

## 2025-01-14 ENCOUNTER — RESULT REVIEW (OUTPATIENT)
Age: 85
End: 2025-01-14

## 2025-01-30 ENCOUNTER — RX RENEWAL (OUTPATIENT)
Age: 85
End: 2025-01-30

## 2025-01-31 ENCOUNTER — RX RENEWAL (OUTPATIENT)
Age: 85
End: 2025-01-31

## 2025-02-11 ENCOUNTER — NON-APPOINTMENT (OUTPATIENT)
Age: 85
End: 2025-02-11

## 2025-02-12 ENCOUNTER — APPOINTMENT (OUTPATIENT)
Dept: FAMILY MEDICINE | Facility: CLINIC | Age: 85
End: 2025-02-12
Payer: MEDICARE

## 2025-02-12 VITALS
BODY MASS INDEX: 22.05 KG/M2 | HEIGHT: 70 IN | HEART RATE: 88 BPM | DIASTOLIC BLOOD PRESSURE: 80 MMHG | WEIGHT: 154 LBS | OXYGEN SATURATION: 98 % | SYSTOLIC BLOOD PRESSURE: 100 MMHG

## 2025-02-12 DIAGNOSIS — I10 ESSENTIAL (PRIMARY) HYPERTENSION: ICD-10-CM

## 2025-02-12 DIAGNOSIS — C61 MALIGNANT NEOPLASM OF PROSTATE: ICD-10-CM

## 2025-02-12 PROCEDURE — 99213 OFFICE O/P EST LOW 20 MIN: CPT

## 2025-02-18 ENCOUNTER — NON-APPOINTMENT (OUTPATIENT)
Age: 85
End: 2025-02-18

## 2025-02-18 RX ORDER — TAMSULOSIN HYDROCHLORIDE 0.4 MG/1
0.4 CAPSULE ORAL DAILY
Qty: 30 | Refills: 1 | Status: ACTIVE | COMMUNITY
Start: 2025-02-18 | End: 1900-01-01

## 2025-02-25 ENCOUNTER — NON-APPOINTMENT (OUTPATIENT)
Age: 85
End: 2025-02-25

## 2025-03-03 ENCOUNTER — NON-APPOINTMENT (OUTPATIENT)
Age: 85
End: 2025-03-03

## 2025-03-04 ENCOUNTER — NON-APPOINTMENT (OUTPATIENT)
Age: 85
End: 2025-03-04

## 2025-03-04 VITALS
BODY MASS INDEX: 22.05 KG/M2 | HEIGHT: 70 IN | RESPIRATION RATE: 16 BRPM | DIASTOLIC BLOOD PRESSURE: 82 MMHG | SYSTOLIC BLOOD PRESSURE: 143 MMHG | OXYGEN SATURATION: 99 % | TEMPERATURE: 98.8 F | WEIGHT: 154 LBS | HEART RATE: 80 BPM

## 2025-03-06 ENCOUNTER — APPOINTMENT (OUTPATIENT)
Dept: HEMATOLOGY ONCOLOGY | Facility: CLINIC | Age: 85
End: 2025-03-06
Payer: MEDICARE

## 2025-03-06 VITALS
HEART RATE: 86 BPM | HEIGHT: 70 IN | TEMPERATURE: 97 F | SYSTOLIC BLOOD PRESSURE: 138 MMHG | BODY MASS INDEX: 22.19 KG/M2 | WEIGHT: 155 LBS | DIASTOLIC BLOOD PRESSURE: 78 MMHG | RESPIRATION RATE: 16 BRPM | OXYGEN SATURATION: 97 %

## 2025-03-06 DIAGNOSIS — D46.9 MYELODYSPLASTIC SYNDROME, UNSPECIFIED: ICD-10-CM

## 2025-03-06 DIAGNOSIS — C61 MALIGNANT NEOPLASM OF PROSTATE: ICD-10-CM

## 2025-03-06 PROCEDURE — G2211 COMPLEX E/M VISIT ADD ON: CPT

## 2025-03-06 PROCEDURE — 99215 OFFICE O/P EST HI 40 MIN: CPT

## 2025-03-07 ENCOUNTER — APPOINTMENT (OUTPATIENT)
Dept: NEPHROLOGY | Facility: CLINIC | Age: 85
End: 2025-03-07
Payer: MEDICARE

## 2025-03-07 VITALS
BODY MASS INDEX: 21.76 KG/M2 | WEIGHT: 152 LBS | OXYGEN SATURATION: 99 % | DIASTOLIC BLOOD PRESSURE: 70 MMHG | SYSTOLIC BLOOD PRESSURE: 140 MMHG | HEIGHT: 70 IN | HEART RATE: 74 BPM

## 2025-03-07 VITALS — SYSTOLIC BLOOD PRESSURE: 135 MMHG | DIASTOLIC BLOOD PRESSURE: 75 MMHG

## 2025-03-07 DIAGNOSIS — I10 ESSENTIAL (PRIMARY) HYPERTENSION: ICD-10-CM

## 2025-03-07 DIAGNOSIS — N18.30 CHRONIC KIDNEY DISEASE, STAGE 3 UNSPECIFIED: ICD-10-CM

## 2025-03-07 DIAGNOSIS — R80.8 OTHER PROTEINURIA: ICD-10-CM

## 2025-03-07 PROCEDURE — 99213 OFFICE O/P EST LOW 20 MIN: CPT

## 2025-03-11 ENCOUNTER — NON-APPOINTMENT (OUTPATIENT)
Age: 85
End: 2025-03-11

## 2025-03-11 DIAGNOSIS — C61 MALIGNANT NEOPLASM OF PROSTATE: ICD-10-CM

## 2025-03-11 LAB
ANION GAP SERPL CALC-SCNC: 8 MMOL/L
APPEARANCE: CLEAR
BACTERIA: NEGATIVE /HPF
BILIRUBIN URINE: NEGATIVE
BLOOD URINE: NEGATIVE
BUN SERPL-MCNC: 13 MG/DL
CALCIUM SERPL-MCNC: 8.8 MG/DL
CAST: 0 /LPF
CHLORIDE SERPL-SCNC: 104 MMOL/L
CO2 SERPL-SCNC: 28 MMOL/L
COLOR: YELLOW
CREAT SERPL-MCNC: 1.06 MG/DL
CREAT SPEC-SCNC: 150 MG/DL
CREAT/PROT UR: 0.1 RATIO
EGFRCR SERPLBLD CKD-EPI 2021: 69 ML/MIN/1.73M2
EPITHELIAL CELLS: 1 /HPF
GLUCOSE QUALITATIVE U: NEGATIVE MG/DL
GLUCOSE SERPL-MCNC: 172 MG/DL
KETONES URINE: ABNORMAL MG/DL
LEUKOCYTE ESTERASE URINE: ABNORMAL
MICROSCOPIC-UA: NORMAL
NITRITE URINE: NEGATIVE
PH URINE: 6
POTASSIUM SERPL-SCNC: 4.7 MMOL/L
PROT UR-MCNC: 21 MG/DL
PROTEIN URINE: NEGATIVE MG/DL
RED BLOOD CELLS URINE: 2 /HPF
REVIEW: NORMAL
SODIUM SERPL-SCNC: 141 MMOL/L
SPECIFIC GRAVITY URINE: 1.02
UROBILINOGEN URINE: 0.2 MG/DL
WHITE BLOOD CELLS URINE: 3 /HPF

## 2025-03-14 RX ORDER — BICALUTAMIDE 50 MG/1
50 TABLET ORAL DAILY
Qty: 30 | Refills: 0 | Status: ACTIVE | COMMUNITY
Start: 2025-03-14 | End: 1900-01-01

## 2025-03-17 DIAGNOSIS — N17.9 ACUTE KIDNEY FAILURE, UNSPECIFIED: ICD-10-CM

## 2025-03-17 DIAGNOSIS — N18.30 CHRONIC KIDNEY DISEASE, STAGE 3 UNSPECIFIED: ICD-10-CM

## 2025-03-17 DIAGNOSIS — N18.9 ACUTE KIDNEY FAILURE, UNSPECIFIED: ICD-10-CM

## 2025-03-17 DIAGNOSIS — R94.4 ABNORMAL RESULTS OF KIDNEY FUNCTION STUDIES: ICD-10-CM

## 2025-03-17 DIAGNOSIS — R80.8 OTHER PROTEINURIA: ICD-10-CM

## 2025-03-20 ENCOUNTER — APPOINTMENT (OUTPATIENT)
Dept: FAMILY MEDICINE | Facility: CLINIC | Age: 85
End: 2025-03-20
Payer: MEDICARE

## 2025-03-20 VITALS
BODY MASS INDEX: 21.47 KG/M2 | WEIGHT: 150 LBS | HEIGHT: 70 IN | OXYGEN SATURATION: 98 % | HEART RATE: 77 BPM | DIASTOLIC BLOOD PRESSURE: 70 MMHG | SYSTOLIC BLOOD PRESSURE: 110 MMHG

## 2025-03-20 DIAGNOSIS — E11.39 TYPE 2 DIABETES MELLITUS WITH OTHER DIABETIC OPHTHALMIC COMPLICATION: ICD-10-CM

## 2025-03-20 PROCEDURE — 99213 OFFICE O/P EST LOW 20 MIN: CPT

## 2025-03-20 PROCEDURE — G2211 COMPLEX E/M VISIT ADD ON: CPT

## 2025-03-20 PROCEDURE — 36415 COLL VENOUS BLD VENIPUNCTURE: CPT

## 2025-03-21 LAB
ANION GAP SERPL CALC-SCNC: 13 MMOL/L
BUN SERPL-MCNC: 14 MG/DL
CALCIUM SERPL-MCNC: 8.9 MG/DL
CHLORIDE SERPL-SCNC: 106 MMOL/L
CO2 SERPL-SCNC: 25 MMOL/L
CREAT SERPL-MCNC: 1.16 MG/DL
EGFRCR SERPLBLD CKD-EPI 2021: 62 ML/MIN/1.73M2
ESTIMATED AVERAGE GLUCOSE: 151 MG/DL
GLUCOSE SERPL-MCNC: 106 MG/DL
HBA1C MFR BLD HPLC: 6.9 %
POTASSIUM SERPL-SCNC: 4.6 MMOL/L
SODIUM SERPL-SCNC: 143 MMOL/L

## 2025-04-03 ENCOUNTER — RESULT REVIEW (OUTPATIENT)
Age: 85
End: 2025-04-03

## 2025-04-03 ENCOUNTER — APPOINTMENT (OUTPATIENT)
Dept: HEMATOLOGY ONCOLOGY | Facility: CLINIC | Age: 85
End: 2025-04-03
Payer: MEDICARE

## 2025-04-03 VITALS
RESPIRATION RATE: 16 BRPM | HEART RATE: 51 BPM | DIASTOLIC BLOOD PRESSURE: 60 MMHG | SYSTOLIC BLOOD PRESSURE: 128 MMHG | BODY MASS INDEX: 22.52 KG/M2 | WEIGHT: 157.31 LBS | OXYGEN SATURATION: 99 % | TEMPERATURE: 96.9 F | HEIGHT: 70 IN

## 2025-04-03 DIAGNOSIS — D46.9 MYELODYSPLASTIC SYNDROME, UNSPECIFIED: ICD-10-CM

## 2025-04-03 DIAGNOSIS — C61 MALIGNANT NEOPLASM OF PROSTATE: ICD-10-CM

## 2025-04-03 DIAGNOSIS — D69.6 THROMBOCYTOPENIA, UNSPECIFIED: ICD-10-CM

## 2025-04-03 PROCEDURE — G2211 COMPLEX E/M VISIT ADD ON: CPT

## 2025-04-03 PROCEDURE — 99215 OFFICE O/P EST HI 40 MIN: CPT

## 2025-04-03 RX ORDER — BICALUTAMIDE 50 MG/1
50 TABLET ORAL DAILY
Qty: 90 | Refills: 1 | Status: ACTIVE | COMMUNITY
Start: 2025-04-03 | End: 1900-01-01

## 2025-04-25 ENCOUNTER — RESULT REVIEW (OUTPATIENT)
Age: 85
End: 2025-04-25

## 2025-04-25 ENCOUNTER — APPOINTMENT (OUTPATIENT)
Dept: HEMATOLOGY ONCOLOGY | Facility: CLINIC | Age: 85
End: 2025-04-25
Payer: MEDICARE

## 2025-04-25 VITALS
TEMPERATURE: 97.2 F | DIASTOLIC BLOOD PRESSURE: 43 MMHG | RESPIRATION RATE: 16 BRPM | OXYGEN SATURATION: 99 % | SYSTOLIC BLOOD PRESSURE: 115 MMHG | HEART RATE: 46 BPM | BODY MASS INDEX: 22.82 KG/M2 | HEIGHT: 70 IN | WEIGHT: 159.38 LBS

## 2025-04-25 DIAGNOSIS — R00.1 BRADYCARDIA, UNSPECIFIED: ICD-10-CM

## 2025-04-25 DIAGNOSIS — C61 MALIGNANT NEOPLASM OF PROSTATE: ICD-10-CM

## 2025-04-25 PROCEDURE — 36415 COLL VENOUS BLD VENIPUNCTURE: CPT

## 2025-04-25 PROCEDURE — G2211 COMPLEX E/M VISIT ADD ON: CPT

## 2025-04-25 PROCEDURE — 99215 OFFICE O/P EST HI 40 MIN: CPT

## 2025-05-01 ENCOUNTER — RX RENEWAL (OUTPATIENT)
Age: 85
End: 2025-05-01

## 2025-05-06 ENCOUNTER — APPOINTMENT (OUTPATIENT)
Dept: HEMATOLOGY ONCOLOGY | Facility: CLINIC | Age: 85
End: 2025-05-06

## 2025-05-09 ENCOUNTER — APPOINTMENT (OUTPATIENT)
Dept: RADIATION ONCOLOGY | Facility: CLINIC | Age: 85
End: 2025-05-09
Payer: MEDICARE

## 2025-05-09 VITALS
TEMPERATURE: 96.1 F | HEART RATE: 74 BPM | RESPIRATION RATE: 18 BRPM | SYSTOLIC BLOOD PRESSURE: 139 MMHG | DIASTOLIC BLOOD PRESSURE: 79 MMHG | OXYGEN SATURATION: 98 %

## 2025-05-09 PROCEDURE — 99024 POSTOP FOLLOW-UP VISIT: CPT

## 2025-05-15 ENCOUNTER — NON-APPOINTMENT (OUTPATIENT)
Age: 85
End: 2025-05-15

## 2025-05-15 ENCOUNTER — APPOINTMENT (OUTPATIENT)
Dept: HEART AND VASCULAR | Facility: CLINIC | Age: 85
End: 2025-05-15

## 2025-05-15 VITALS
BODY MASS INDEX: 21.52 KG/M2 | HEART RATE: 78 BPM | DIASTOLIC BLOOD PRESSURE: 80 MMHG | SYSTOLIC BLOOD PRESSURE: 122 MMHG | OXYGEN SATURATION: 99 % | WEIGHT: 150 LBS

## 2025-05-15 DIAGNOSIS — I10 ESSENTIAL (PRIMARY) HYPERTENSION: ICD-10-CM

## 2025-05-15 DIAGNOSIS — E78.5 HYPERLIPIDEMIA, UNSPECIFIED: ICD-10-CM

## 2025-05-15 DIAGNOSIS — K76.0 FATTY (CHANGE OF) LIVER, NOT ELSEWHERE CLASSIFIED: ICD-10-CM

## 2025-05-15 DIAGNOSIS — I49.8 OTHER SPECIFIED CARDIAC ARRHYTHMIAS: ICD-10-CM

## 2025-05-15 DIAGNOSIS — R94.31 ABNORMAL ELECTROCARDIOGRAM [ECG] [EKG]: ICD-10-CM

## 2025-05-15 PROCEDURE — 99204 OFFICE O/P NEW MOD 45 MIN: CPT

## 2025-05-15 PROCEDURE — G2211 COMPLEX E/M VISIT ADD ON: CPT

## 2025-05-15 PROCEDURE — 93000 ELECTROCARDIOGRAM COMPLETE: CPT

## 2025-05-16 ENCOUNTER — RESULT REVIEW (OUTPATIENT)
Age: 85
End: 2025-05-16

## 2025-05-16 ENCOUNTER — APPOINTMENT (OUTPATIENT)
Dept: HEMATOLOGY ONCOLOGY | Facility: CLINIC | Age: 85
End: 2025-05-16
Payer: MEDICARE

## 2025-05-16 VITALS
DIASTOLIC BLOOD PRESSURE: 79 MMHG | TEMPERATURE: 96.9 F | HEIGHT: 70 IN | OXYGEN SATURATION: 99 % | BODY MASS INDEX: 21.81 KG/M2 | SYSTOLIC BLOOD PRESSURE: 134 MMHG | RESPIRATION RATE: 16 BRPM | WEIGHT: 152.31 LBS | HEART RATE: 65 BPM

## 2025-05-16 DIAGNOSIS — D46.9 MYELODYSPLASTIC SYNDROME, UNSPECIFIED: ICD-10-CM

## 2025-05-16 DIAGNOSIS — C61 MALIGNANT NEOPLASM OF PROSTATE: ICD-10-CM

## 2025-05-16 PROCEDURE — 99214 OFFICE O/P EST MOD 30 MIN: CPT

## 2025-05-16 PROCEDURE — 36415 COLL VENOUS BLD VENIPUNCTURE: CPT

## 2025-05-16 PROCEDURE — G2211 COMPLEX E/M VISIT ADD ON: CPT

## 2025-06-02 ENCOUNTER — APPOINTMENT (OUTPATIENT)
Dept: CARDIOLOGY | Facility: CLINIC | Age: 85
End: 2025-06-02
Payer: MEDICARE

## 2025-06-02 PROCEDURE — 93306 TTE W/DOPPLER COMPLETE: CPT

## 2025-06-04 DIAGNOSIS — I51.89 OTHER ILL-DEFINED HEART DISEASES: ICD-10-CM

## 2025-06-04 RX ORDER — METOPROLOL SUCCINATE 25 MG/1
25 TABLET, EXTENDED RELEASE ORAL DAILY
Qty: 90 | Refills: 3 | Status: ACTIVE | COMMUNITY
Start: 2025-06-04 | End: 1900-01-01

## 2025-06-06 ENCOUNTER — RESULT REVIEW (OUTPATIENT)
Age: 85
End: 2025-06-06

## 2025-06-06 ENCOUNTER — APPOINTMENT (OUTPATIENT)
Dept: HEMATOLOGY ONCOLOGY | Facility: CLINIC | Age: 85
End: 2025-06-06
Payer: MEDICARE

## 2025-06-06 VITALS
HEIGHT: 70 IN | HEART RATE: 33 BPM | TEMPERATURE: 97 F | WEIGHT: 152 LBS | SYSTOLIC BLOOD PRESSURE: 133 MMHG | DIASTOLIC BLOOD PRESSURE: 53 MMHG | OXYGEN SATURATION: 100 % | BODY MASS INDEX: 21.76 KG/M2 | RESPIRATION RATE: 16 BRPM

## 2025-06-06 PROCEDURE — G2211 COMPLEX E/M VISIT ADD ON: CPT

## 2025-06-06 PROCEDURE — 36415 COLL VENOUS BLD VENIPUNCTURE: CPT

## 2025-06-06 PROCEDURE — 99215 OFFICE O/P EST HI 40 MIN: CPT

## 2025-07-01 ENCOUNTER — RESULT REVIEW (OUTPATIENT)
Age: 85
End: 2025-07-01

## 2025-07-01 ENCOUNTER — APPOINTMENT (OUTPATIENT)
Dept: HEMATOLOGY ONCOLOGY | Facility: CLINIC | Age: 85
End: 2025-07-01
Payer: MEDICARE

## 2025-07-01 VITALS
HEIGHT: 70 IN | RESPIRATION RATE: 16 BRPM | HEART RATE: 54 BPM | WEIGHT: 154 LBS | TEMPERATURE: 97 F | SYSTOLIC BLOOD PRESSURE: 130 MMHG | DIASTOLIC BLOOD PRESSURE: 67 MMHG | OXYGEN SATURATION: 97 % | BODY MASS INDEX: 22.05 KG/M2

## 2025-07-01 PROCEDURE — G2211 COMPLEX E/M VISIT ADD ON: CPT

## 2025-07-01 PROCEDURE — 36415 COLL VENOUS BLD VENIPUNCTURE: CPT

## 2025-07-01 PROCEDURE — 99214 OFFICE O/P EST MOD 30 MIN: CPT

## 2025-07-08 ENCOUNTER — APPOINTMENT (OUTPATIENT)
Facility: CLINIC | Age: 85
End: 2025-07-08

## 2025-07-11 ENCOUNTER — APPOINTMENT (OUTPATIENT)
Dept: NEPHROLOGY | Facility: CLINIC | Age: 85
End: 2025-07-11

## 2025-07-15 ENCOUNTER — APPOINTMENT (OUTPATIENT)
Dept: CARDIOLOGY | Facility: CLINIC | Age: 85
End: 2025-07-15

## 2025-07-22 ENCOUNTER — APPOINTMENT (OUTPATIENT)
Dept: FAMILY MEDICINE | Facility: CLINIC | Age: 85
End: 2025-07-22
Payer: MEDICARE

## 2025-07-22 VITALS
HEIGHT: 70 IN | DIASTOLIC BLOOD PRESSURE: 80 MMHG | WEIGHT: 154 LBS | BODY MASS INDEX: 22.05 KG/M2 | OXYGEN SATURATION: 100 % | HEART RATE: 59 BPM | SYSTOLIC BLOOD PRESSURE: 120 MMHG

## 2025-07-22 DIAGNOSIS — E11.39 TYPE 2 DIABETES MELLITUS WITH OTHER DIABETIC OPHTHALMIC COMPLICATION: ICD-10-CM

## 2025-07-22 PROCEDURE — 36415 COLL VENOUS BLD VENIPUNCTURE: CPT

## 2025-07-22 PROCEDURE — 99213 OFFICE O/P EST LOW 20 MIN: CPT

## 2025-07-31 ENCOUNTER — APPOINTMENT (OUTPATIENT)
Dept: HEMATOLOGY ONCOLOGY | Facility: CLINIC | Age: 85
End: 2025-07-31
Payer: MEDICARE

## 2025-07-31 ENCOUNTER — RESULT REVIEW (OUTPATIENT)
Age: 85
End: 2025-07-31

## 2025-07-31 VITALS
OXYGEN SATURATION: 98 % | DIASTOLIC BLOOD PRESSURE: 63 MMHG | WEIGHT: 157.38 LBS | TEMPERATURE: 98 F | RESPIRATION RATE: 16 BRPM | HEART RATE: 48 BPM | HEIGHT: 70 IN | BODY MASS INDEX: 22.53 KG/M2 | SYSTOLIC BLOOD PRESSURE: 134 MMHG

## 2025-07-31 DIAGNOSIS — D46.9 MYELODYSPLASTIC SYNDROME, UNSPECIFIED: ICD-10-CM

## 2025-07-31 DIAGNOSIS — D47.2 MONOCLONAL GAMMOPATHY: ICD-10-CM

## 2025-07-31 DIAGNOSIS — C61 MALIGNANT NEOPLASM OF PROSTATE: ICD-10-CM

## 2025-07-31 PROCEDURE — 99215 OFFICE O/P EST HI 40 MIN: CPT

## 2025-07-31 PROCEDURE — G2211 COMPLEX E/M VISIT ADD ON: CPT

## 2025-07-31 PROCEDURE — 36415 COLL VENOUS BLD VENIPUNCTURE: CPT

## 2025-08-04 ENCOUNTER — RX RENEWAL (OUTPATIENT)
Age: 85
End: 2025-08-04

## 2025-08-05 LAB
ANION GAP SERPL CALC-SCNC: 15 MMOL/L
BUN SERPL-MCNC: 21 MG/DL
CALCIUM SERPL-MCNC: 9.4 MG/DL
CHLORIDE SERPL-SCNC: 104 MMOL/L
CO2 SERPL-SCNC: 21 MMOL/L
CREAT SERPL-MCNC: 1.28 MG/DL
EGFRCR SERPLBLD CKD-EPI 2021: 55 ML/MIN/1.73M2
ESTIMATED AVERAGE GLUCOSE: 140 MG/DL
GLUCOSE SERPL-MCNC: 142 MG/DL
HBA1C MFR BLD HPLC: 6.5 %
POTASSIUM SERPL-SCNC: 4.1 MMOL/L
SODIUM SERPL-SCNC: 140 MMOL/L

## 2025-08-11 ENCOUNTER — APPOINTMENT (OUTPATIENT)
Dept: CARDIOLOGY | Facility: CLINIC | Age: 85
End: 2025-08-11
Payer: MEDICARE

## 2025-08-11 DIAGNOSIS — R94.39 ABNORMAL RESULT OF OTHER CARDIOVASCULAR FUNCTION STUDY: ICD-10-CM

## 2025-08-11 PROCEDURE — 93351 STRESS TTE COMPLETE: CPT

## 2025-08-11 PROCEDURE — ZZZZZ: CPT | Mod: NC

## 2025-08-13 PROBLEM — R94.39 ABNORMAL STRESS ECHO: Status: ACTIVE | Noted: 2025-08-13

## 2025-08-21 ENCOUNTER — APPOINTMENT (OUTPATIENT)
Dept: RADIATION ONCOLOGY | Facility: CLINIC | Age: 85
End: 2025-08-21
Payer: MEDICARE

## 2025-08-21 VITALS
DIASTOLIC BLOOD PRESSURE: 70 MMHG | SYSTOLIC BLOOD PRESSURE: 138 MMHG | RESPIRATION RATE: 18 BRPM | OXYGEN SATURATION: 98 % | HEART RATE: 60 BPM | TEMPERATURE: 98 F

## 2025-08-21 PROCEDURE — G2211 COMPLEX E/M VISIT ADD ON: CPT

## 2025-08-21 PROCEDURE — 99214 OFFICE O/P EST MOD 30 MIN: CPT

## 2025-08-28 ENCOUNTER — RESULT REVIEW (OUTPATIENT)
Age: 85
End: 2025-08-28

## 2025-08-28 ENCOUNTER — APPOINTMENT (OUTPATIENT)
Dept: HEMATOLOGY ONCOLOGY | Facility: CLINIC | Age: 85
End: 2025-08-28
Payer: MEDICARE

## 2025-08-28 VITALS
TEMPERATURE: 97.6 F | DIASTOLIC BLOOD PRESSURE: 68 MMHG | HEIGHT: 68.5 IN | BODY MASS INDEX: 26.11 KG/M2 | OXYGEN SATURATION: 97 % | WEIGHT: 174.25 LBS | SYSTOLIC BLOOD PRESSURE: 125 MMHG | HEART RATE: 50 BPM | RESPIRATION RATE: 16 BRPM

## 2025-08-28 DIAGNOSIS — D46.9 MYELODYSPLASTIC SYNDROME, UNSPECIFIED: ICD-10-CM

## 2025-08-28 DIAGNOSIS — C61 MALIGNANT NEOPLASM OF PROSTATE: ICD-10-CM

## 2025-08-28 PROCEDURE — G2211 COMPLEX E/M VISIT ADD ON: CPT

## 2025-08-28 PROCEDURE — 99214 OFFICE O/P EST MOD 30 MIN: CPT

## 2025-08-28 PROCEDURE — 36415 COLL VENOUS BLD VENIPUNCTURE: CPT
